# Patient Record
Sex: FEMALE | Race: WHITE | Employment: FULL TIME | URBAN - METROPOLITAN AREA
[De-identification: names, ages, dates, MRNs, and addresses within clinical notes are randomized per-mention and may not be internally consistent; named-entity substitution may affect disease eponyms.]

---

## 2016-05-12 LAB — PAP SMEAR - HIM PATIENT REPORTED: NEGATIVE

## 2017-01-25 DIAGNOSIS — F41.1 GAD (GENERALIZED ANXIETY DISORDER): Primary | ICD-10-CM

## 2017-01-25 NOTE — TELEPHONE ENCOUNTER
ALPRAZolam (XANAX) 0.5 MG tablet      Last Written Prescription Date:  12/6/16  Last Fill Quantity: 30,   # refills: 0  Last Office Visit with [facility], [facility] or [facility] prescribing provider: 12/6/16  Future Office visit:       Routing refill request to provider for review/approval because:  Drug not on the [facility], [facility] or [facility] refill protocol or controlled substance

## 2017-01-26 RX ORDER — ALPRAZOLAM 0.5 MG
TABLET ORAL
Qty: 30 TABLET | Refills: 1 | Status: SHIPPED | OUTPATIENT
Start: 2017-01-26 | End: 2017-03-30

## 2017-03-30 ENCOUNTER — TELEPHONE (OUTPATIENT)
Dept: FAMILY MEDICINE | Facility: CLINIC | Age: 47
End: 2017-03-30

## 2017-03-30 DIAGNOSIS — F41.1 GAD (GENERALIZED ANXIETY DISORDER): ICD-10-CM

## 2017-03-30 RX ORDER — ALPRAZOLAM 0.5 MG
TABLET ORAL
Qty: 30 TABLET | Refills: 0 | Status: SHIPPED | OUTPATIENT
Start: 2017-03-30 | End: 2017-05-05

## 2017-03-30 NOTE — TELEPHONE ENCOUNTER
Xanax       Last Written Prescription Date: 01/26/17  Last Fill Quantity: 30,  # refills: 1   Last Office Visit with [facility], [facility] or [facility] prescribing provider: 12/06/16                                             Routing refill request to provider for review/approval because:  Drug not on the [facility] refill protocol   Pilar Echavarria RN

## 2017-04-18 ENCOUNTER — TRANSFERRED RECORDS (OUTPATIENT)
Dept: HEALTH INFORMATION MANAGEMENT | Facility: CLINIC | Age: 47
End: 2017-04-18

## 2017-05-03 ENCOUNTER — TELEPHONE (OUTPATIENT)
Dept: FAMILY MEDICINE | Facility: CLINIC | Age: 47
End: 2017-05-03

## 2017-05-03 NOTE — TELEPHONE ENCOUNTER
Panel Management Review      BP Readings from Last 1 Encounters:   12/06/16 118/86    , No results found for: A1C, 3/30/2017    Fail List measure: cervical      Patient is due/failing the following:   PAP    Action needed:   Pt has appt at [facility] next week to have PAP done.     Type of outreach:    Phone, spoke to patient.  Will route chart to abstracting     Questions for provider review:    None                                                                                                                                    Yuliana Rankin, [facility]

## 2017-05-05 DIAGNOSIS — F41.1 GAD (GENERALIZED ANXIETY DISORDER): ICD-10-CM

## 2017-05-05 RX ORDER — ALPRAZOLAM 0.5 MG
TABLET ORAL
Qty: 30 TABLET | Refills: 0 | Status: SHIPPED | OUTPATIENT
Start: 2017-05-05 | End: 2017-06-27

## 2017-05-05 NOTE — TELEPHONE ENCOUNTER
ALPRAZolam (XANAX) 0.5 MG tablet      Last Written Prescription Date:  3/30/17  Last Fill Quantity: 30,   # refills: 0  Last Office Visit with [facility], [facility] or [facility] prescribing provider: 12/6/16  Future Office visit:       Routing refill request to provider for review/approval because:  Drug not on the [facility], [facility] or [facility] refill protocol or controlled substance

## 2017-05-12 ENCOUNTER — TRANSFERRED RECORDS (OUTPATIENT)
Dept: HEALTH INFORMATION MANAGEMENT | Facility: CLINIC | Age: 47
End: 2017-05-12

## 2017-06-20 ENCOUNTER — TRANSFERRED RECORDS (OUTPATIENT)
Dept: HEALTH INFORMATION MANAGEMENT | Facility: CLINIC | Age: 47
End: 2017-06-20

## 2017-06-20 LAB
HPV ABSTRACT: NORMAL
PAP-ABSTRACT: NORMAL

## 2017-06-27 ENCOUNTER — TELEPHONE (OUTPATIENT)
Dept: FAMILY MEDICINE | Facility: CLINIC | Age: 47
End: 2017-06-27

## 2017-06-27 DIAGNOSIS — F41.1 GAD (GENERALIZED ANXIETY DISORDER): ICD-10-CM

## 2017-06-27 RX ORDER — ALPRAZOLAM 0.5 MG
TABLET ORAL
Qty: 15 TABLET | Refills: 0 | Status: SHIPPED | OUTPATIENT
Start: 2017-06-27 | End: 2017-08-22

## 2017-06-27 NOTE — TELEPHONE ENCOUNTER
ALPRAZolam (XANAX) 0.5 MG tablet      Last Written Prescription Date:  05/05/17  Last Fill Quantity: 30,   # refills: 0  Last Office Visit with [facility], [facility] or [facility] prescribing provider: 12/06/16 Dr. Gonzalez  Future Office visit:       Routing refill request to provider for review/approval because:  Drug not on the [facility], [facility] or [facility] refill protocol or controlled substance

## 2017-06-29 NOTE — TELEPHONE ENCOUNTER
Reason for Call:  Other prescription    Detailed comments: please refax Xanix asa  pharmacy has not received it thanks, can you call her when done     Phone Number Patient can be reached at: [phone]    Best Time: any    Can we leave a detailed message on this number? YES    Call taken on 6/29/2017 at 4:39 PM by Rae Marks

## 2017-08-22 ENCOUNTER — OFFICE VISIT (OUTPATIENT)
Dept: FAMILY MEDICINE | Facility: CLINIC | Age: 47
End: 2017-08-22
Payer: COMMERCIAL

## 2017-08-22 VITALS
WEIGHT: 162.5 LBS | BODY MASS INDEX: 24.63 KG/M2 | OXYGEN SATURATION: 98 % | TEMPERATURE: 98.2 F | HEART RATE: 60 BPM | SYSTOLIC BLOOD PRESSURE: 126 MMHG | DIASTOLIC BLOOD PRESSURE: 86 MMHG | HEIGHT: 68 IN

## 2017-08-22 DIAGNOSIS — N84.0 ENDOMETRIAL POLYP: ICD-10-CM

## 2017-08-22 DIAGNOSIS — F41.1 GAD (GENERALIZED ANXIETY DISORDER): Primary | ICD-10-CM

## 2017-08-22 DIAGNOSIS — J06.9 VIRAL UPPER RESPIRATORY TRACT INFECTION: ICD-10-CM

## 2017-08-22 DIAGNOSIS — R09.81 CONGESTION OF PARANASAL SINUS: ICD-10-CM

## 2017-08-22 PROCEDURE — 99214 OFFICE O/P EST MOD 30 MIN: CPT | Performed by: FAMILY MEDICINE

## 2017-08-22 RX ORDER — ALPRAZOLAM 0.5 MG
TABLET ORAL
Qty: 30 TABLET | Refills: 0 | Status: SHIPPED | OUTPATIENT
Start: 2017-08-22 | End: 2017-11-22

## 2017-08-22 ASSESSMENT — ANXIETY QUESTIONNAIRES
GAD7 TOTAL SCORE: 3
6. BECOMING EASILY ANNOYED OR IRRITABLE: NOT AT ALL
7. FEELING AFRAID AS IF SOMETHING AWFUL MIGHT HAPPEN: NOT AT ALL
1. FEELING NERVOUS, ANXIOUS, OR ON EDGE: SEVERAL DAYS
3. WORRYING TOO MUCH ABOUT DIFFERENT THINGS: NOT AT ALL
2. NOT BEING ABLE TO STOP OR CONTROL WORRYING: NOT AT ALL
IF YOU CHECKED OFF ANY PROBLEMS ON THIS QUESTIONNAIRE, HOW DIFFICULT HAVE THESE PROBLEMS MADE IT FOR YOU TO DO YOUR WORK, TAKE CARE OF THINGS AT HOME, OR GET ALONG WITH OTHER PEOPLE: NOT DIFFICULT AT ALL
5. BEING SO RESTLESS THAT IT IS HARD TO SIT STILL: SEVERAL DAYS

## 2017-08-22 ASSESSMENT — PATIENT HEALTH QUESTIONNAIRE - PHQ9
5. POOR APPETITE OR OVEREATING: SEVERAL DAYS
SUM OF ALL RESPONSES TO PHQ QUESTIONS 1-9: 0

## 2017-08-22 ASSESSMENT — PAIN SCALES - GENERAL: PAINLEVEL: NO PAIN (0)

## 2017-08-22 NOTE — PROGRESS NOTES
SUBJECTIVE:   Celina Ledesma is a 46 year old female who presents to clinic today for the following health issues:      Medication Followup of Alprazolam    Taking Medication as prescribed: yes    Side Effects:  None    Medication Helping Symptoms:  Yes    Symptoms improved overall with less need for the alprazolam - when taken it worse well.  Random symptoms.       Taking trazodone daily 1/2-1 pill.  Has supply at home.  Nausea if awakening in middle of night.      Depression and Anxiety Follow-Up    Status since last visit: Improved     Other associated symptoms:None    Complicating factors:     Significant life event: No     Current substance abuse: None  Counseling in progress.  PHQ-9 SCORE 11/8/2016 12/6/2016 8/22/2017   Total Score 0 2 0     YUDI-7 SCORE 12/6/2016 8/22/2017   Total Score 9 3       PHQ-9  English  PHQ-9   Any Language  GAD7    Endometrial polyp - U/S 6/27/17.  Not heavy flow.  No cramping.  U/S through [facility] with polyp noted.  No treatment recommended.    URI - congestion, post nasal drainage.  Symptoms present for last week.  No fever.  Occasional cough.  No sore throat.        Problem list and histories reviewed & adjusted, as indicated.  Additional history: as documented    BP Readings from Last 3 Encounters:   08/22/17 126/86   12/06/16 118/86   11/08/16 110/82    Wt Readings from Last 3 Encounters:   08/22/17 73.7 kg (162 lb 8 oz)   12/06/16 73.4 kg (161 lb 12.8 oz)   11/08/16 73.6 kg (162 lb 3.2 oz)                      Reviewed and updated as needed this visit by clinical staffTobacco  Allergies  Meds  Med Hx  Surg Hx  Fam Hx  Soc Hx      Reviewed and updated as needed this visit by Provider  Tobacco  Allergies  Meds  Med Hx  Surg Hx  Fam Hx  Soc Hx        ROS:  Constitutional, HEENT, cardiovascular, pulmonary, gi and gu systems are negative, except as otherwise noted.      OBJECTIVE:   /86 (BP Location: Right arm, Patient Position: Chair, Cuff Size:  "Adult Regular)  Pulse 60  Temp 98.2  F (36.8  C) (Oral)  Ht 1.724 m (5' 7.87\")  Wt 73.7 kg (162 lb 8 oz)  LMP 07/03/2017 (Exact Date)  SpO2 98%  Breastfeeding? No  BMI 24.8 kg/m2  Body mass index is 24.8 kg/(m^2).  GENERAL: alert and no distress  HENT: normal cephalic/atraumatic, both ears: clear effusion, nose and mouth without ulcers or lesions, nasal mucosa edematous , rhinorrhea clear, white and postnasal drainage, oropharynx clear and oral mucous membranes moist  NECK: no adenopathy, no asymmetry, masses, or scars and thyroid normal to palpation  RESP: lungs clear to auscultation - no rales, rhonchi or wheezes  CV: regular rate and rhythm, normal S1 S2, no S3 or S4, no murmur, click or rub, no peripheral edema and peripheral pulses strong  MS: no gross musculoskeletal defects noted, no edema  PSYCH: mentation appears normal, affect normal/bright, anxious, judgement and insight intact and appearance well groomed        ASSESSMENT/PLAN:         1. YUDI (generalized anxiety disorder)  Continue Effexor, Buspar 5 mg twice a day (larger doses not tolerated), trazodone at night.  Prn xanax - using much less than previous.    - ALPRAZolam (XANAX) 0.5 MG tablet; TAKE 1/2-1 TABLET BY MOUTH 3 TIMES DAILY AS NEEDED FOR ANXIETY  Dispense: 30 tablet; Refill: 0    2. Viral upper respiratory tract infection  3. Congestion of paranasal sinus  Symptomatic care as listed.  Send follow up message if symptoms persist through next 3-4 days and amoxicillin 875 mg twice a day will be given for sinus/ear.      4. Endometrial polyp  On U/S monitor bleeding.  Hysteroscopy/D&C if heavy or prolonged bleeding occurs.        Patient Instructions   Continue the current Buspar, Effexor and Trazodone.  Use the alprazolam as needed.      For the congestion symptoms, continue the sudafed you have at home.  If you have continued symptoms through the next 2-3 days send message with update and additional treatment can be given.  "           Lucero Gonzalez MD  [facility]

## 2017-08-22 NOTE — PATIENT INSTRUCTIONS
Continue the current Buspar, Effexor and Trazodone.  Use the alprazolam as needed.      For the congestion symptoms, continue the sudafed you have at home.  If you have continued symptoms through the next 2-3 days send message with update and additional treatment can be given.

## 2017-08-22 NOTE — Clinical Note
Please abstract the following data from this visit with this patient into the appropriate field in Epic:  Pap smear done on this date: 6/20/17 (approximately), by this group: [facility] - in Care Everywhere, results were PAP -normal HPV negative.

## 2017-08-22 NOTE — MR AVS SNAPSHOT
"              After Visit Summary   8/22/2017    Celina Ledesma    MRN: [id number]           Patient Information     Date Of Birth          1970        Visit Information        Provider Department      8/22/2017 9:20 AM Lucero Gonzalez MD [facility]        Today's Diagnoses     YUDI (generalized anxiety disorder)          Care Instructions    Continue the current Buspar, Effexor and Trazodone.  Use the alprazolam as needed.      For the congestion symptoms, continue the sudafed you have at home.  If you have continued symptoms through the next 2-3 days send message with update and additional treatment can be given.                Follow-ups after your visit        Who to contact     If you have questions or need follow up information about today's clinic visit or your schedule please contact [facility] directly at [phone].  Normal or non-critical lab and imaging results will be communicated to you by Confabbhart, letter or phone within 4 business days after the clinic has received the results. If you do not hear from us within 7 days, please contact the clinic through MyChart or phone. If you have a critical or abnormal lab result, we will notify you by phone as soon as possible.  Submit refill requests through [employer] or call your pharmacy and they will forward the refill request to us. Please allow 3 business days for your refill to be completed.          Additional Information About Your Visit        MyChart Information     [employer] lets you send messages to your doctor, view your test results, renew your prescriptions, schedule appointments and more. To sign up, go to [email or web address] . Click on \"Log in\" on the left side of the screen, which will take you to the Welcome page. Then click on \"Sign up Now\" on the right side of the page.     You will be asked to enter the access code listed below, as well as some personal information. Please follow the directions " "to create your username and password.     Your access code is: [email or web address]  Expires: 2017 10:05 AM     Your access code will  in 90 days. If you need help or a new code, please call your [facility] clinic or [phone].        Care EveryWhere ID     This is your Care EveryWhere ID. This could be used by other organizations to access your [facility] medical records  [id number]        Your Vitals Were     Pulse Temperature Height Last Period Pulse Oximetry Breastfeeding?    60 98.2  F (36.8  C) (Oral) 1.724 m (5' 7.87\") 2017 (Exact Date) 98% No    BMI (Body Mass Index)                   24.8 kg/m2            Blood Pressure from Last 3 Encounters:   17 126/86   16 118/86   16 110/82    Weight from Last 3 Encounters:   17 73.7 kg (162 lb 8 oz)   16 73.4 kg (161 lb 12.8 oz)   16 73.6 kg (162 lb 3.2 oz)              Today, you had the following     No orders found for display         Today's Medication Changes          These changes are accurate as of: 17 10:07 AM.  If you have any questions, ask your nurse or doctor.               These medicines have changed or have updated prescriptions.        Dose/Directions    ALPRAZolam 0.5 MG tablet   Commonly known as:  XANAX   This may have changed:  additional instructions   Used for:  YUDI (generalized anxiety disorder)   Changed by:  Lucero Gonzaelz MD        TAKE 1/2-1 TABLET BY MOUTH 3 TIMES DAILY AS NEEDED FOR ANXIETY   Quantity:  30 tablet   Refills:  0            Where to get your medicines      Some of these will need a paper prescription and others can be bought over the counter.  Ask your nurse if you have questions.     Bring a paper prescription for each of these medications     ALPRAZolam 0.5 MG tablet                Primary Care Provider    None Specified       No primary provider on file.        Equal Access to Services     SARKIS HOFFMAN AH: Miranda Pacheco, anastasia jesus, junie banerjee " shaggy rincondedra tanichano martaan ah. So [facility] [phone].    ATENCIÓN: Si norbertola venice, tiene a quiñonez disposición servicios gratuitos de asistencia lingüística. Sheryl al [phone].    We comply with applicable federal civil rights laws and [place] laws. We do not discriminate on the basis of race, color, national origin, age, disability sex, sexual orientation or gender identity.            Thank you!     Thank you for choosing [facility]  for your care. Our goal is always to provide you with excellent care. Hearing back from our patients is one way we can continue to improve our services. Please take a few minutes to complete the written survey that you may receive in the mail after your visit with us. Thank you!             Your Updated Medication List - Protect others around you: Learn how to safely use, store and throw away your medicines at www.disposemymeds.org.          This list is accurate as of: 8/22/17 10:07 AM.  Always use your most recent med list.                   Brand Name Dispense Instructions for use Diagnosis    ALPRAZolam 0.5 MG tablet    XANAX    30 tablet    TAKE 1/2-1 TABLET BY MOUTH 3 TIMES DAILY AS NEEDED FOR ANXIETY    YUDI (generalized anxiety disorder)       busPIRone 5 MG tablet    BUSPAR    60 tablet    Take 1 tablet (5 mg) by mouth 2 times daily    YUDI (generalized anxiety disorder)       clindamycin 1 % topical gel    CLINDAMAX     APPLY TOPICALLY 2 TIMES DAILY.        traZODone 50 MG tablet    DESYREL     TAKE 1 TAB BY MOUTH DAILY AT BEDTIME        venlafaxine 75 MG 24 hr capsule    EFFEXOR-XR    30 capsule    Take 1 capsule (75 mg) by mouth daily    UYDI (generalized anxiety disorder)

## 2017-08-22 NOTE — NURSING NOTE
"Chief Complaint   Patient presents with     Recheck Medication     Alprazolam       Initial /86 (BP Location: Right arm, Patient Position: Chair, Cuff Size: Adult Regular)  Pulse 60  Temp 98.2  F (36.8  C) (Oral)  Ht 1.724 m (5' 7.87\")  Wt 73.7 kg (162 lb 8 oz)  LMP 07/03/2017 (Exact Date)  SpO2 98%  Breastfeeding? No  BMI 24.8 kg/m2 Estimated body mass index is 24.8 kg/(m^2) as calculated from the following:    Height as of this encounter: 1.724 m (5' 7.87\").    Weight as of this encounter: 73.7 kg (162 lb 8 oz).  Medication Reconciliation: complete     AMBER Tran MA      "

## 2017-08-23 ASSESSMENT — ANXIETY QUESTIONNAIRES: GAD7 TOTAL SCORE: 3

## 2017-08-24 ENCOUNTER — MYC MEDICAL ADVICE (OUTPATIENT)
Dept: FAMILY MEDICINE | Facility: CLINIC | Age: 47
End: 2017-08-24

## 2017-08-24 DIAGNOSIS — J01.00 ACUTE MAXILLARY SINUSITIS, RECURRENCE NOT SPECIFIED: ICD-10-CM

## 2017-08-24 DIAGNOSIS — H65.03 BILATERAL ACUTE SEROUS OTITIS MEDIA, RECURRENCE NOT SPECIFIED: Primary | ICD-10-CM

## 2017-08-24 RX ORDER — AMOXICILLIN 875 MG
875 TABLET ORAL 2 TIMES DAILY
Qty: 20 TABLET | Refills: 0 | Status: SHIPPED | OUTPATIENT
Start: 2017-08-24 | End: 2018-02-12

## 2017-08-24 NOTE — TELEPHONE ENCOUNTER
See office visit note for further information.    Allyson Cole RN, [facility] Triage

## 2017-11-22 DIAGNOSIS — F41.1 GAD (GENERALIZED ANXIETY DISORDER): ICD-10-CM

## 2017-11-24 RX ORDER — ALPRAZOLAM 0.5 MG
TABLET ORAL
Qty: 30 TABLET | Refills: 0 | Status: SHIPPED | OUTPATIENT
Start: 2017-11-24 | End: 2018-02-12

## 2017-12-04 ENCOUNTER — TRANSFERRED RECORDS (OUTPATIENT)
Dept: HEALTH INFORMATION MANAGEMENT | Facility: CLINIC | Age: 47
End: 2017-12-04

## 2018-01-24 DIAGNOSIS — F41.1 GAD (GENERALIZED ANXIETY DISORDER): ICD-10-CM

## 2018-01-24 DIAGNOSIS — F51.01 PRIMARY INSOMNIA: Primary | ICD-10-CM

## 2018-01-24 RX ORDER — BUSPIRONE HYDROCHLORIDE 5 MG/1
5 TABLET ORAL 2 TIMES DAILY
Qty: 60 TABLET | Refills: 0 | Status: SHIPPED | OUTPATIENT
Start: 2018-01-24 | End: 2018-02-12

## 2018-01-24 RX ORDER — VENLAFAXINE HYDROCHLORIDE 75 MG/1
75 CAPSULE, EXTENDED RELEASE ORAL DAILY
Qty: 30 CAPSULE | Refills: 0 | Status: SHIPPED | OUTPATIENT
Start: 2018-01-24 | End: 2018-02-12

## 2018-01-24 RX ORDER — TRAZODONE HYDROCHLORIDE 50 MG/1
TABLET, FILM COATED ORAL
Qty: 30 TABLET | Refills: 0 | Status: SHIPPED | OUTPATIENT
Start: 2018-01-24 | End: 2018-02-12

## 2018-01-24 NOTE — TELEPHONE ENCOUNTER
.Reason for Call:  Medication or medication refill:    Do you use a [facility]?  Name of the pharmacy and phone number for the current request:  [id number] - [place], [place] - [address] [place] [address] [address] [Patient Preferred]  [phone]    Name of the medication requested: 1. traZODone (DESYREL) 50 MG tablet  2. busPIRone (BUSPAR) 5 MG tablet  3. venlafaxine (EFFEXOR-XR) 75 MG 24 hr capsule    Other request:     Can we leave a detailed message on this number? YES    Phone number patient can be reached at: Cell number on file:    Telephone Information:   Mobile [phone]       Best Time: any    Call taken on 1/24/2018 at 10:44 AM by Nicole Thacker

## 2018-01-24 NOTE — TELEPHONE ENCOUNTER
Requested Prescriptions   Pending Prescriptions Disp Refills     busPIRone (BUSPAR) 5 MG tablet 60 tablet 5     Sig: Take 1 tablet (5 mg) by mouth 2 times daily    There is no refill protocol information for this order        venlafaxine (EFFEXOR-XR) 75 MG 24 hr capsule 30 capsule 1     Sig: Take 1 capsule (75 mg) by mouth daily    There is no refill protocol information for this order        traZODone (DESYREL) 50 MG tablet 90 tablet 1     Sig: TAKE 1 TAB BY MOUTH DAILY AT BEDTIME    There is no refill protocol information for this order        PHQ-9 SCORE 11/8/2016 12/6/2016 8/22/2017   Total Score 0 2 0     Last OV: 8/22/2017    Routing refill request to provider for review/approval because:  Medication is reported/historical-Melezadone    April Mckeon RN, BSN

## 2018-02-12 ENCOUNTER — OFFICE VISIT (OUTPATIENT)
Dept: FAMILY MEDICINE | Facility: CLINIC | Age: 48
End: 2018-02-12
Payer: COMMERCIAL

## 2018-02-12 VITALS
OXYGEN SATURATION: 99 % | HEART RATE: 73 BPM | RESPIRATION RATE: 14 BRPM | DIASTOLIC BLOOD PRESSURE: 80 MMHG | TEMPERATURE: 98 F | SYSTOLIC BLOOD PRESSURE: 124 MMHG | WEIGHT: 174.8 LBS | BODY MASS INDEX: 26.68 KG/M2

## 2018-02-12 DIAGNOSIS — F41.1 GAD (GENERALIZED ANXIETY DISORDER): Primary | ICD-10-CM

## 2018-02-12 DIAGNOSIS — R93.89 ABNORMAL CHEST X-RAY: ICD-10-CM

## 2018-02-12 DIAGNOSIS — F51.01 PRIMARY INSOMNIA: ICD-10-CM

## 2018-02-12 DIAGNOSIS — M21.619 BUNION: ICD-10-CM

## 2018-02-12 DIAGNOSIS — D17.30 LIPOMA OF SKIN AND SUBCUTANEOUS TISSUE: ICD-10-CM

## 2018-02-12 PROCEDURE — 99214 OFFICE O/P EST MOD 30 MIN: CPT | Performed by: FAMILY MEDICINE

## 2018-02-12 RX ORDER — BUSPIRONE HYDROCHLORIDE 5 MG/1
5 TABLET ORAL 2 TIMES DAILY
Qty: 180 TABLET | Refills: 1 | Status: SHIPPED | OUTPATIENT
Start: 2018-02-12 | End: 2018-08-15

## 2018-02-12 RX ORDER — VENLAFAXINE HYDROCHLORIDE 75 MG/1
75 CAPSULE, EXTENDED RELEASE ORAL DAILY
Qty: 90 CAPSULE | Refills: 1 | Status: SHIPPED | OUTPATIENT
Start: 2018-02-12 | End: 2018-08-08

## 2018-02-12 RX ORDER — ALPRAZOLAM 0.5 MG
TABLET ORAL
Qty: 30 TABLET | Refills: 0 | Status: SHIPPED | OUTPATIENT
Start: 2018-02-12 | End: 2018-04-05

## 2018-02-12 RX ORDER — TRAZODONE HYDROCHLORIDE 50 MG/1
TABLET, FILM COATED ORAL
Qty: 90 TABLET | Refills: 1 | Status: SHIPPED | OUTPATIENT
Start: 2018-02-12 | End: 2018-08-15

## 2018-02-12 ASSESSMENT — PAIN SCALES - GENERAL: PAINLEVEL: NO PAIN (0)

## 2018-02-12 ASSESSMENT — ANXIETY QUESTIONNAIRES
GAD7 TOTAL SCORE: 5
6. BECOMING EASILY ANNOYED OR IRRITABLE: NOT AT ALL
1. FEELING NERVOUS, ANXIOUS, OR ON EDGE: SEVERAL DAYS
3. WORRYING TOO MUCH ABOUT DIFFERENT THINGS: SEVERAL DAYS
5. BEING SO RESTLESS THAT IT IS HARD TO SIT STILL: SEVERAL DAYS
2. NOT BEING ABLE TO STOP OR CONTROL WORRYING: NOT AT ALL
7. FEELING AFRAID AS IF SOMETHING AWFUL MIGHT HAPPEN: SEVERAL DAYS

## 2018-02-12 ASSESSMENT — PATIENT HEALTH QUESTIONNAIRE - PHQ9: 5. POOR APPETITE OR OVEREATING: SEVERAL DAYS

## 2018-02-12 NOTE — PROGRESS NOTES
SUBJECTIVE:   Celina Ledesma is a 47 year old female who presents to clinic today for the following health issues:    Anxiety Follow-Up    Status since last visit: No change    Other associated symptoms:None    Complicating factors: none  Significant life event: No   Current substance abuse: None  Depression symptoms: No    YUDI-7 SCORE 12/6/2016 8/22/2017 2/12/2018   Total Score 9 3 5     PHQ-9 SCORE 12/6/2016 8/22/2017 2/12/2018   Total Score 2 0 3       YUDI-7    Amount of exercise or physical activity: 6-7 days/week for an average of 45-60 minutes    Problems taking medications regularly: No    Medication side effects: none    Diet: regular (no restrictions)    Problem list and histories reviewed & adjusted, as indicated.  Additional history: as documented    BP Readings from Last 3 Encounters:   02/12/18 124/80   08/22/17 126/86   12/06/16 118/86    Wt Readings from Last 3 Encounters:   02/12/18 79.3 kg (174 lb 12.8 oz)   08/22/17 73.7 kg (162 lb 8 oz)   12/06/16 73.4 kg (161 lb 12.8 oz)         Patient states that her anxiety and depression has been manageable and has no change since last visit. She has been taking Vitamin D medication and using a sun lamp throughout the winter. She reports sleeping well and having no troubles when taking trazodone.  She continues to have high anxiety over health concerns.    Abnormal chest xray -   Patient was seen at [facility] with cough/URI symptoms.  Xray done with notation of a questionable nodular opacity of the right rib/lung area.  Was to follow up in 1 month per their recommendation but did not follow up.  No cough or other symptoms currently.  Declines immediate follow up with will consider cxr in April when she return to clinic for annual exam.      [facility]2017  [facility]  Result Narrative   COMPARISON:  None.    FINDINGS:  Two views were obtained. No evidence for pneumonia. Questionable nodular opacity projecting over the posterior lateral  portion of the fourth rib on the right. Density over the rib suggests that this is either calcified or located within the rib itself, but a noncalcified pulmonary nodule cannot be excluded. Consider chest CT without contrast for further evaluation if clinically indicated.       Right Forearm  Patient had questions regarding a lump on the anterior side of her right forearm. She claims the lump is never painful.    Reviewed and updated as needed this visit by clinical staff  Tobacco  Meds  Med Hx  Surg Hx  Fam Hx  Soc Hx      Reviewed and updated as needed this visit by Provider  Tobacco  Allergies  Meds  Med Hx  Surg Hx  Fam Hx  Soc Hx      ROS:  Constitutional, HEENT, cardiovascular, pulmonary, GI, , musculoskeletal, neuro, skin, endocrine and psych systems are negative, except as otherwise noted.    This document serves as a record of the services and decisions personally performed and made by Lucero Gonzalez MD. It was created on her behalf by ISIDRO CHANEY, a trained medical scribe. The creation of this document is based on the provider's statements to the medical scribe.  ISIDRO CHANEY 3:31 PM February 12, 2018    OBJECTIVE:     /80 (BP Location: Right arm, Patient Position: Sitting, Cuff Size: Adult Large)  Pulse 73  Temp 98  F (36.7  C) (Oral)  Resp 14  Wt 79.3 kg (174 lb 12.8 oz)  LMP 02/02/2018  SpO2 99%  Breastfeeding? No  BMI 26.68 kg/m2  Body mass index is 26.68 kg/(m^2).  GENERAL: alert, no distress and over weight  RESP: lungs clear to auscultation - no rales, rhonchi or wheezes  CV: regular rate and rhythm, normal S1 S2, no S3 or S4, no murmur, click or rub, no peripheral edema and peripheral pulses strong  SKIN: right forearm 3 mm mobile lipomatous like lump, no overlying skin change  PSYCH:  Mentation intact, no overt depression noted, mild anxiety, well groomed.        Diagnostic Test Results:  none     ASSESSMENT/PLAN:     1. YUDI (generalized anxiety disorder)  Refills  today.  Continue current medication with prn xanax.    - traZODone (DESYREL) 50 MG tablet; TAKE 1 TAB BY MOUTH DAILY AT BEDTIME  Dispense: 90 tablet; Refill: 1  - venlafaxine (EFFEXOR-XR) 75 MG 24 hr capsule; Take 1 capsule (75 mg) by mouth daily  Dispense: 90 capsule; Refill: 1  - busPIRone (BUSPAR) 5 MG tablet; Take 1 tablet (5 mg) by mouth 2 times daily  Dispense: 180 tablet; Refill: 1  - ALPRAZolam (XANAX) 0.5 MG tablet; TAKE 1/2-1 TABLET BY MOUTH 3 TIMES A DAY AS NEEDED FOR ANXIETY  Dispense: 30 tablet; Refill: 0    2. Primary insomnia  Continue   - traZODone (DESYREL) 50 MG tablet; TAKE 1 TAB BY MOUTH DAILY AT BEDTIME  Dispense: 90 tablet; Refill: 1    3. Bunion  Consider evaluation if pain or skin breakdown occurs.     4. Lipoma of skin and subcutaneous tissue  Monitor with annual    5.  Abnormal chest xray  Repeat xray and/or CT with annual exam appointment.        Patient Instructions     Refill today.    Plan on doing two appointments a year for chronic problems. One for your physical exam and the other for a med recheck appointment.    Follow up in April for physical exam and X-ray.    Wear wide, supportive shoes. Avoid pointed shoes.          The information in this document, created by the medical scribe for me, accurately reflects the services I personally performed and the decisions made by me. I have reviewed and approved this document for accuracy prior to leaving the patient care area.  February 12, 2018 3:52 PM    Lucero Gonzalez MD  [facility]

## 2018-02-12 NOTE — MR AVS SNAPSHOT
After Visit Summary   2/12/2018    Celina Ledesma    MRN: [id number]           Patient Information     Date Of Birth          1970        Visit Information        Provider Department      2/12/2018 3:20 PM Lucero Gonzalez MD [facility]        Today's Diagnoses     YUDI (generalized anxiety disorder)        Primary insomnia          Care Instructions    Refill today.    Plan on doing two appointments a year for chronic problems. One for your physical exam and the other for a med recheck appointment.    Follow up in April for physical exam and X-ray.    Wear wide, supportive shoes. Avoid pointed shoes.    At [facility], we strive to deliver an exceptional experience to you, every time we see you.  If you receive a survey in the mail, please send us back your thoughts. We really do value your feedback.    Based on your medical history, these are the current health maintenance/preventive care services that you are due for (some may have been done at this visit.)  Health Maintenance Due   Topic Date Due     LIPID SCREEN Q5 YR FEMALE (SYSTEM ASSIGNED)  09/15/2015     INFLUENZA VACCINE (SYSTEM ASSIGNED)  09/01/2017         Suggested websites for health information:  WwwDrifty : Up to date and easily searchable information on multiple topics.  Www.medlineplus.gov : medication info, interactive tutorials, watch real surgeries online  Www.familydoctor.org : good info from the Academy of Family Physicians  Www.cdc.gov : public health info, travel advisories, epidemics (H1N1)  Www.aap.org : children's health info, normal development, vaccinations  Www.health.[facility].[place].us : [place] dept of health, public health issues in [place], N1N1    Your care team:     Family Medicine   AUGUSTA Weaver MD Emily Bunt, APRN JOSE ANTONIO   S. MD Lucero Do MD Angela Wermerskirchen, MD         Clinic hours: Monday - Wednesday 7 am-7 pm    Thursdays and Fridays 7 am-5 pm.     [facility]: Monday - Friday 11 am-9 pm,   Saturday and Sunday 9 am-5 pm.    [facility]: Monday -Thursday 8 am-8 pm; Friday 8 am-6 pm; Saturday and Sunday 9 am-5 pm.     [facility]: Monday - Thursday 8 am - 7 pm; Friday 8 am - 6 pm    Clinic: [phone]   [facility]: [phone]   [facility]: [phone]                  Follow-ups after your visit        Who to contact     If you have questions or need follow up information about today's clinic visit or your schedule please contact [facility] directly at [phone].  Normal or non-critical lab and imaging results will be communicated to you by MyChart, letter or phone within 4 business days after the clinic has received the results. If you do not hear from us within 7 days, please contact the clinic through Punchhhart or phone. If you have a critical or abnormal lab result, we will notify you by phone as soon as possible.  Submit refill requests through [employer] or call your pharmacy and they will forward the refill request to us. Please allow 3 business days for your refill to be completed.          Additional Information About Your Visit        MyChart Information     [employer] gives you secure access to your electronic health record. If you see a primary care provider, you can also send messages to your care team and make appointments. If you have questions, please call your primary care clinic.  If you do not have a primary care provider, please call [phone] and they will assist you.        Care EveryWhere ID     This is your Care EveryWhere ID. This could be used by other organizations to access your [place] medical records  [id number]        Your Vitals Were     Pulse Temperature Respirations Last Period Pulse Oximetry Breastfeeding?    73 98  F (36.7  C) (Oral) 14 02/02/2018 99% No    BMI (Body Mass Index)                    26.68 kg/m2            Blood Pressure from Last 3 Encounters:   02/12/18 124/80   08/22/17 126/86   12/06/16 118/86    Weight from Last 3 Encounters:   02/12/18 79.3 kg (174 lb 12.8 oz)   08/22/17 73.7 kg (162 lb 8 oz)   12/06/16 73.4 kg (161 lb 12.8 oz)              Today, you had the following     No orders found for display         Today's Medication Changes          These changes are accurate as of 2/12/18  3:47 PM.  If you have any questions, ask your nurse or doctor.               These medicines have changed or have updated prescriptions.        Dose/Directions    ALPRAZolam 0.5 MG tablet   Commonly known as:  XANAX   This may have changed:  See the new instructions.   Used for:  YUDI (generalized anxiety disorder)   Changed by:  Lucero Gonzalez MD        TAKE 1/2-1 TABLET BY MOUTH 3 TIMES A DAY AS NEEDED FOR ANXIETY   Quantity:  30 tablet   Refills:  0       traZODone 50 MG tablet   Commonly known as:  DESYREL   This may have changed:  additional instructions   Used for:  YUDI (generalized anxiety disorder), Primary insomnia   Changed by:  Lucero Gonzalez MD        TAKE 1 TAB BY MOUTH DAILY AT BEDTIME   Quantity:  90 tablet   Refills:  1       venlafaxine 75 MG 24 hr capsule   Commonly known as:  EFFEXOR-XR   This may have changed:  additional instructions   Used for:  YUDI (generalized anxiety disorder)   Changed by:  Lucero Gonzalez MD        Dose:  75 mg   Take 1 capsule (75 mg) by mouth daily   Quantity:  90 capsule   Refills:  1         Stop taking these medicines if you haven't already. Please contact your care team if you have questions.     amoxicillin 875 MG tablet   Commonly known as:  AMOXIL   Stopped by:  Lucero Gonzalez MD                Where to get your medicines      These medications were sent to [facility]/pharmacy #[id number] - [place], [place] - [address]  [address], [place] [address]     Phone:  [phone]     busPIRone 5 MG tablet    traZODone 50 MG tablet     venlafaxine 75 MG 24 hr capsule         Some of these will need a paper prescription and others can be bought over the counter.  Ask your nurse if you have questions.     Bring a paper prescription for each of these medications     ALPRAZolam 0.5 MG tablet                Primary Care Provider Office Phone # Fax #    Lucero Gonzalez [phone] [address]  [facility] [address]        Equal Access to Services     SARKIS HOFFMAN : Hadii aad ku hadasho Soomaali, waaxda luqadaha, qaybta kaalmada adeegyada, waxay idiin hayaan adeeg kharash lagal . So [facility] [phone].    ATENCIÓN: Si spencer millard, tiene a quiñonez disposición servicios gratuitos de asistencia lingüística. Sheryl al [phone].    We comply with applicable federal civil rights laws and [place] laws. We do not discriminate on the basis of race, color, national origin, age, disability, sex, sexual orientation, or gender identity.            Thank you!     Thank you for choosing [facility]  for your care. Our goal is always to provide you with excellent care. Hearing back from our patients is one way we can continue to improve our services. Please take a few minutes to complete the written survey that you may receive in the mail after your visit with us. Thank you!             Your Updated Medication List - Protect others around you: Learn how to safely use, store and throw away your medicines at www.disposemymeds.org.          This list is accurate as of 2/12/18  3:47 PM.  Always use your most recent med list.                   Brand Name Dispense Instructions for use Diagnosis    ALPRAZolam 0.5 MG tablet    XANAX    30 tablet    TAKE 1/2-1 TABLET BY MOUTH 3 TIMES A DAY AS NEEDED FOR ANXIETY    YUDI (generalized anxiety disorder)       busPIRone 5 MG tablet    BUSPAR    180 tablet    Take 1 tablet (5 mg) by mouth 2 times daily    YUDI (generalized anxiety disorder)       clindamycin 1 % topical gel    CLINDAMAX      APPLY TOPICALLY 2 TIMES DAILY.        traZODone 50 MG tablet    DESYREL    90 tablet    TAKE 1 TAB BY MOUTH DAILY AT BEDTIME    YUDI (generalized anxiety disorder), Primary insomnia       venlafaxine 75 MG 24 hr capsule    EFFEXOR-XR    90 capsule    Take 1 capsule (75 mg) by mouth daily    YUDI (generalized anxiety disorder)

## 2018-02-12 NOTE — NURSING NOTE
"Chief Complaint   Patient presents with     Recheck Medication       Initial /80 (BP Location: Right arm, Patient Position: Sitting, Cuff Size: Adult Large)  Pulse 73  Temp 98  F (36.7  C) (Oral)  Resp 14  Wt 79.3 kg (174 lb 12.8 oz)  LMP 02/02/2018  SpO2 99%  Breastfeeding? No  BMI 26.68 kg/m2 Estimated body mass index is 26.68 kg/(m^2) as calculated from the following:    Height as of 8/22/17: 1.724 m (5' 7.87\").    Weight as of this encounter: 79.3 kg (174 lb 12.8 oz).  Medication Reconciliation: complete     Thania Santana      "

## 2018-02-12 NOTE — PATIENT INSTRUCTIONS
Refill today.    Plan on doing two appointments a year for chronic problems. One for your physical exam and the other for a med recheck appointment.    Follow up in April for physical exam and X-ray.    Wear wide, supportive shoes. Avoid pointed shoes.    At [facility], we strive to deliver an exceptional experience to you, every time we see you.  If you receive a survey in the mail, please send us back your thoughts. We really do value your feedback.    Based on your medical history, these are the current health maintenance/preventive care services that you are due for (some may have been done at this visit.)  Health Maintenance Due   Topic Date Due     LIPID SCREEN Q5 YR FEMALE (SYSTEM ASSIGNED)  09/15/2015     INFLUENZA VACCINE (SYSTEM ASSIGNED)  09/01/2017         Suggested websites for health information:  Www.Sell My Timeshare NOW.[employer] : Up to date and easily searchable information on multiple topics.  Www.[employer].gov : medication info, interactive tutorials, watch real surgeries online  Www.familydoctor.org : good info from the Academy of Family Physicians  Www.cdc.gov : public health info, travel advisories, epidemics (H1N1)  Www.aap.org : children's health info, normal development, vaccinations  Www.health.[facility].[place].us : [place] dept of health, public health issues in [place], N1N1    Your care team:     Family Medicine   AUGUSTA Weaver MD Emily Bunt, SYL BRADY   S. MD Lucero Do MD Angela Wermerskirchen, MD         Clinic hours: Monday - Wednesday 7 am-7 pm   Thursdays and Fridays 7 am-5 pm.     [facility]: Monday - Friday 11 am-9 pm,   Saturday and Sunday 9 am-5 pm.    [facility]: Monday -Thursday 8 am-8 pm; Friday 8 am-6 pm; Saturday and Sunday 9 am-5 pm.     [facility]: Monday - Thursday 8 am - 7 pm; Friday 8 am - 6 pm    Clinic: [phone]   [facility]: [phone]   [facility]: [phone]

## 2018-02-13 ASSESSMENT — ANXIETY QUESTIONNAIRES: GAD7 TOTAL SCORE: 5

## 2018-02-13 ASSESSMENT — PATIENT HEALTH QUESTIONNAIRE - PHQ9: SUM OF ALL RESPONSES TO PHQ QUESTIONS 1-9: 3

## 2018-03-06 ENCOUNTER — OFFICE VISIT (OUTPATIENT)
Dept: FAMILY MEDICINE | Facility: CLINIC | Age: 48
End: 2018-03-06
Payer: COMMERCIAL

## 2018-03-06 VITALS
TEMPERATURE: 98.4 F | OXYGEN SATURATION: 100 % | SYSTOLIC BLOOD PRESSURE: 124 MMHG | HEART RATE: 68 BPM | BODY MASS INDEX: 26.52 KG/M2 | DIASTOLIC BLOOD PRESSURE: 82 MMHG | HEIGHT: 68 IN | RESPIRATION RATE: 17 BRPM | WEIGHT: 175 LBS

## 2018-03-06 DIAGNOSIS — J06.9 VIRAL URI: Primary | ICD-10-CM

## 2018-03-06 PROCEDURE — 99213 OFFICE O/P EST LOW 20 MIN: CPT | Performed by: PHYSICIAN ASSISTANT

## 2018-03-06 ASSESSMENT — PAIN SCALES - GENERAL: PAINLEVEL: SEVERE PAIN (6)

## 2018-03-06 NOTE — MR AVS SNAPSHOT
After Visit Summary   3/6/2018    Celina Ledesma    MRN: [id number]           Patient Information     Date Of Birth          1970        Visit Information        Provider Department      3/6/2018 10:40 AM Kat Nava PA-C [facility]        Today's Diagnoses     Viral URI    -  1      Care Instructions    At [facility], we strive to deliver an exceptional experience to you, every time we see you.  If you receive a survey in the mail, please send us back your thoughts. We really do value your feedback.    Suggested websites for health information:  Www.EyeQuant.[employer] : Up to date and easily searchable information on multiple topics.  Www.dELiAs.gov : medication info, interactive tutorials, watch real surgeries online  Www.familydoctor.org : good info from the Academy of Family Physicians  Www.cdc.gov : public health info, travel advisories, epidemics (H1N1)  Www.aap.org : children's health info, normal development, vaccinations  Www.health.[facility].[place].us : [place] dept of health, public health issues in [place], N1N1    Your care team:     Family Medicine   AUGUSTA Weaver MD Emily Bunt, APRN CNP   S. MD Lucero Do MD Angela Wermerskirchen, MD         Clinic hours: Monday - Wednesday 7 am-7 pm   Thursdays and Fridays 7 am-5 pm.     [facility]: Monday - Friday 11 am-9 pm,   Saturday and Sunday 9 am-5 pm.    [facility]: Monday -Thursday 8 am-8 pm; Friday 8 am-6 pm; Saturday and Sunday 9 am-5 pm.     [facility]: Monday - Thursday 8 am - 7 pm; Friday 8 am - 6 pm    Clinic: [phone]   [facility]: [phone]   [facility]: [phone]                  Follow-ups after your visit        Who to contact     If you have questions or need follow up information about today's clinic visit or your schedule please contact  "[facility] directly at [phone].  Normal or non-critical lab and imaging results will be communicated to you by MyChart, letter or phone within 4 business days after the clinic has received the results. If you do not hear from us within 7 days, please contact the clinic through Trendslidehart or phone. If you have a critical or abnormal lab result, we will notify you by phone as soon as possible.  Submit refill requests through [employer] or call your pharmacy and they will forward the refill request to us. Please allow 3 business days for your refill to be completed.          Additional Information About Your Visit        TrendslideharBramasol Information     [employer] gives you secure access to your electronic health record. If you see a primary care provider, you can also send messages to your care team and make appointments. If you have questions, please call your primary care clinic.  If you do not have a primary care provider, please call [phone] and they will assist you.        Care EveryWhere ID     This is your Care EveryWhere ID. This could be used by other organizations to access your [place] medical records  [id number]        Your Vitals Were     Pulse Temperature Respirations Height Last Period Pulse Oximetry    68 98.4  F (36.9  C) (Oral) 17 1.721 m (5' 7.75\") 03/04/2018 (Exact Date) 100%    Breastfeeding? BMI (Body Mass Index)                No 26.81 kg/m2           Blood Pressure from Last 3 Encounters:   03/06/18 124/82   02/12/18 124/80   08/22/17 126/86    Weight from Last 3 Encounters:   03/06/18 79.4 kg (175 lb)   02/12/18 79.3 kg (174 lb 12.8 oz)   08/22/17 73.7 kg (162 lb 8 oz)              Today, you had the following     No orders found for display       Primary Care Provider Office Phone # Fax #    Lucero Gonzalez [phone] [address]  [facility] [address]        Equal Access to Services     SARKIS HOFFMAN AH: Hadii anastasia Landis, " junie banerjee aprilshaggy marks ah. So [facility] [phone].    ATENCIÓN: Si spencer millard, tiene a quiñonez disposición servicios gratuitos de asistencia lingüística. Sheryl al [phone].    We comply with applicable federal civil rights laws and [place] laws. We do not discriminate on the basis of race, color, national origin, age, disability, sex, sexual orientation, or gender identity.            Thank you!     Thank you for choosing [facility]  for your care. Our goal is always to provide you with excellent care. Hearing back from our patients is one way we can continue to improve our services. Please take a few minutes to complete the written survey that you may receive in the mail after your visit with us. Thank you!             Your Updated Medication List - Protect others around you: Learn how to safely use, store and throw away your medicines at www.disposemymeds.org.          This list is accurate as of 3/6/18 11:08 AM.  Always use your most recent med list.                   Brand Name Dispense Instructions for use Diagnosis    ALPRAZolam 0.5 MG tablet    XANAX    30 tablet    TAKE 1/2-1 TABLET BY MOUTH 3 TIMES A DAY AS NEEDED FOR ANXIETY    YUDI (generalized anxiety disorder)       busPIRone 5 MG tablet    BUSPAR    180 tablet    Take 1 tablet (5 mg) by mouth 2 times daily    YUDI (generalized anxiety disorder)       clindamycin 1 % topical gel    CLINDAMAX     APPLY TOPICALLY 2 TIMES DAILY.        traZODone 50 MG tablet    DESYREL    90 tablet    TAKE 1 TAB BY MOUTH DAILY AT BEDTIME    YUDI (generalized anxiety disorder), Primary insomnia       venlafaxine 75 MG 24 hr capsule    EFFEXOR-XR    90 capsule    Take 1 capsule (75 mg) by mouth daily    YUDI (generalized anxiety disorder)

## 2018-03-06 NOTE — NURSING NOTE
"Chief Complaint   Patient presents with     Sinus Problem       Initial /82 (BP Location: Right arm, Patient Position: Chair, Cuff Size: Adult Large)  Pulse 68  Temp 98.4  F (36.9  C) (Oral)  Resp 17  Ht 1.721 m (5' 7.75\")  Wt 79.4 kg (175 lb)  LMP 03/04/2018 (Exact Date)  SpO2 100%  Breastfeeding? No  BMI 26.81 kg/m2 Estimated body mass index is 26.81 kg/(m^2) as calculated from the following:    Height as of this encounter: 1.721 m (5' 7.75\").    Weight as of this encounter: 79.4 kg (175 lb).  Medication Reconciliation: complete     Mera Miramontes MA       "

## 2018-03-06 NOTE — PROGRESS NOTES
SUBJECTIVE:   Celina Ledesma is a 47 year old female who presents to clinic today for the following health issues:      Acute Illness   Acute illness concerns: sinus  Onset: 5 days    Fever: no    Chills/Sweats: YES    Headache (location?): YES    Sinus Pressure:YES    Conjunctivitis:  no    Ear Pain: YES: fullness    Rhinorrhea: YES    Congestion: YES    Sore Throat: drainage     Cough: YES-non-productive    Wheeze: no    Decreased Appetite: no    Nausea: no    Vomiting: no    Diarrhea:  no    Dysuria/Freq.: no    Fatigue/Achiness: YES- fatigue    Sick/Strep Exposure: recently traveled [place]     Therapies Tried and outcome: tylenol sinus helps a little        Started with sore throat and then settled into sinuses,  Head pain, eye teeth and ears. Sore glands in neck.  No fever.  Coughing nonproductive.  Some shortness of breath with activity.  Stuff from nose clear.  For a time was a little green.  Still able to breathe through nose when sleeping.  Cough not keeping up at noc      Problem list and histories reviewed & adjusted, as indicated.  Additional history: as documented    Patient Active Problem List   Diagnosis     YUDI (generalized anxiety disorder)     Endometrial polyp     Lipoma of skin and subcutaneous tissue     History reviewed. No pertinent surgical history.    Social History   Substance Use Topics     Smoking status: Former Smoker     Quit date: 1/1/2000     Smokeless tobacco: Never Used     Alcohol use No     Family History   Problem Relation Age of Onset     DIABETES Father      HEART DISEASE Maternal Grandmother      HEART DISEASE Maternal Grandfather          Current Outpatient Prescriptions   Medication Sig Dispense Refill     traZODone (DESYREL) 50 MG tablet TAKE 1 TAB BY MOUTH DAILY AT BEDTIME 90 tablet 1     venlafaxine (EFFEXOR-XR) 75 MG 24 hr capsule Take 1 capsule (75 mg) by mouth daily 90 capsule 1     busPIRone (BUSPAR) 5 MG tablet Take 1 tablet (5 mg) by mouth 2 times  "daily 180 tablet 1     ALPRAZolam (XANAX) 0.5 MG tablet TAKE 1/2-1 TABLET BY MOUTH 3 TIMES A DAY AS NEEDED FOR ANXIETY 30 tablet 0     clindamycin (CLINDAMAX) 1 % gel APPLY TOPICALLY 2 TIMES DAILY.  6       Reviewed and updated as needed this visit by clinical staff  Tobacco  Allergies  Meds  Problems  Med Hx  Surg Hx  Fam Hx  Soc Hx        Reviewed and updated as needed this visit by Provider  Tobacco  Allergies  Meds  Problems  Med Hx  Surg Hx  Fam Hx  Soc Hx          ROS:  Constitutional, HEENT, cardiovascular, pulmonary, gi and gu systems are negative, except as otherwise noted.    OBJECTIVE:     /82 (BP Location: Right arm, Patient Position: Chair, Cuff Size: Adult Large)  Pulse 68  Temp 98.4  F (36.9  C) (Oral)  Resp 17  Ht 1.721 m (5' 7.75\")  Wt 79.4 kg (175 lb)  LMP 03/04/2018 (Exact Date)  SpO2 100%  Breastfeeding? No  BMI 26.81 kg/m2  Body mass index is 26.81 kg/(m^2).  GENERAL: healthy, alert and no distress  EYES: Eyes grossly normal to inspection, PERRL and conjunctivae and sclerae normal  HENT: normal cephalic/atraumatic, ear canals and TM's normal, nasal mucosa edematous , rhinorrhea clear, oropharynx clear and oral mucous membranes moist  NECK: no adenopathy, no asymmetry, masses, or scars and thyroid normal to palpation  RESP: lungs clear to auscultation - no rales, rhonchi or wheezes  CV: regular rate and rhythm, normal S1 S2, no S3 or S4, no murmur, click or rub, no peripheral edema and peripheral pulses strong  MS: no gross musculoskeletal defects noted, no edema    Diagnostic Test Results:  none     ASSESSMENT/PLAN:             1. Viral URI  Given constellation of symptoms and only symptomatic for five days feel viral infection.   Continue nettipot and over the counter med. Declined cough syrup.  Follow up with us if no improvement over the next week.  Return urgently if any change in symptoms.  Warning signs and symptoms warranting urgent evaluation reviewed. "           Kat Nava PA-C  [facility]

## 2018-03-06 NOTE — PATIENT INSTRUCTIONS
At [facility], we strive to deliver an exceptional experience to you, every time we see you.  If you receive a survey in the mail, please send us back your thoughts. We really do value your feedback.    Suggested websites for health information:  Www.[place].org : Up to date and easily searchable information on multiple topics.  Www.medlineplus.gov : medication info, interactive tutorials, watch real surgeries online  Www.familydoctor.org : good info from the Academy of Family Physicians  Www.cdc.gov : public health info, travel advisories, epidemics (H1N1)  Www.aap.org : children's health info, normal development, vaccinations  Www.health.[facility].[place].us : [place] dept of health, public health issues in [place], N1N1    Your care team:     Family Medicine   AUGUSTA Weaver MD Emily Bunt, SYL BRADY   S. MD Lucero Do MD Angela Wermerskirchen, MD         Clinic hours: Monday - Wednesday 7 am-7 pm   Thursdays and Fridays 7 am-5 pm.     [facility]: Monday - Friday 11 am-9 pm,   Saturday and Sunday 9 am-5 pm.    [facility]: Monday -Thursday 8 am-8 pm; Friday 8 am-6 pm; Saturday and Sunday 9 am-5 pm.     [facility]: Monday - Thursday 8 am - 7 pm; Friday 8 am - 6 pm    Clinic: [phone]   [facility]: [phone]   [facility]: [phone]

## 2018-04-05 ENCOUNTER — RADIANT APPOINTMENT (OUTPATIENT)
Dept: GENERAL RADIOLOGY | Facility: CLINIC | Age: 48
End: 2018-04-05
Attending: FAMILY MEDICINE
Payer: COMMERCIAL

## 2018-04-05 ENCOUNTER — OFFICE VISIT (OUTPATIENT)
Dept: FAMILY MEDICINE | Facility: CLINIC | Age: 48
End: 2018-04-05
Payer: COMMERCIAL

## 2018-04-05 VITALS
OXYGEN SATURATION: 99 % | SYSTOLIC BLOOD PRESSURE: 110 MMHG | WEIGHT: 170 LBS | DIASTOLIC BLOOD PRESSURE: 82 MMHG | RESPIRATION RATE: 16 BRPM | TEMPERATURE: 98.7 F | HEART RATE: 59 BPM | HEIGHT: 68 IN | BODY MASS INDEX: 25.76 KG/M2

## 2018-04-05 DIAGNOSIS — Z00.00 ROUTINE GENERAL MEDICAL EXAMINATION AT A HEALTH CARE FACILITY: ICD-10-CM

## 2018-04-05 DIAGNOSIS — F41.1 GAD (GENERALIZED ANXIETY DISORDER): ICD-10-CM

## 2018-04-05 DIAGNOSIS — Z00.00 ROUTINE GENERAL MEDICAL EXAMINATION AT A HEALTH CARE FACILITY: Primary | ICD-10-CM

## 2018-04-05 DIAGNOSIS — J98.4 LUNG DENSITY ON X-RAY: ICD-10-CM

## 2018-04-05 DIAGNOSIS — R09.89 CHRONIC THROAT CLEARING: ICD-10-CM

## 2018-04-05 LAB
ANION GAP SERPL CALCULATED.3IONS-SCNC: 5 MMOL/L (ref 3–14)
BUN SERPL-MCNC: 16 MG/DL (ref 7–30)
CALCIUM SERPL-MCNC: 9.1 MG/DL (ref 8.5–10.1)
CHLORIDE SERPL-SCNC: 102 MMOL/L (ref 94–109)
CHOLEST SERPL-MCNC: 181 MG/DL
CO2 SERPL-SCNC: 31 MMOL/L (ref 20–32)
CREAT SERPL-MCNC: 0.92 MG/DL (ref 0.52–1.04)
GFR SERPL CREATININE-BSD FRML MDRD: 65 ML/MIN/1.7M2
GLUCOSE SERPL-MCNC: 97 MG/DL (ref 70–99)
HDLC SERPL-MCNC: 57 MG/DL
HGB BLD-MCNC: 14.6 G/DL (ref 11.7–15.7)
LDLC SERPL CALC-MCNC: 99 MG/DL
NONHDLC SERPL-MCNC: 124 MG/DL
POTASSIUM SERPL-SCNC: 4.1 MMOL/L (ref 3.4–5.3)
SODIUM SERPL-SCNC: 138 MMOL/L (ref 133–144)
TRIGL SERPL-MCNC: 124 MG/DL

## 2018-04-05 PROCEDURE — 85018 HEMOGLOBIN: CPT | Performed by: FAMILY MEDICINE

## 2018-04-05 PROCEDURE — 36415 COLL VENOUS BLD VENIPUNCTURE: CPT | Performed by: FAMILY MEDICINE

## 2018-04-05 PROCEDURE — 99213 OFFICE O/P EST LOW 20 MIN: CPT | Mod: 25 | Performed by: FAMILY MEDICINE

## 2018-04-05 PROCEDURE — 80048 BASIC METABOLIC PNL TOTAL CA: CPT | Performed by: FAMILY MEDICINE

## 2018-04-05 PROCEDURE — 71046 X-RAY EXAM CHEST 2 VIEWS: CPT | Mod: FY

## 2018-04-05 PROCEDURE — 99396 PREV VISIT EST AGE 40-64: CPT | Performed by: FAMILY MEDICINE

## 2018-04-05 PROCEDURE — 80061 LIPID PANEL: CPT | Performed by: FAMILY MEDICINE

## 2018-04-05 RX ORDER — ALPRAZOLAM 0.5 MG
TABLET ORAL
Qty: 30 TABLET | Refills: 0 | Status: SHIPPED | OUTPATIENT
Start: 2018-04-05 | End: 2018-06-26
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
## 2018-04-05 NOTE — PATIENT INSTRUCTIONS
Refill Xanax today. Take as directed.     Labs today. I will notify you of results when I receive them.    For the clearing of the throat,  you can use claritin (loratidine) 10 mg OR zyrtec (cetrizine) 10 mg OR allegra (Fexofenadine) 180 mg. Each of these are dosed once daily.Take for 2 to 3 weeks, if symptoms improve you can continue to use this, If symptoms do not improve let me know for other recommendations.         Preventive Health Recommendations  Female Ages 40 to 49    Yearly exam:     See your health care provider every year in order to  1. Review health changes.   2. Discuss preventive care.    3. Review your medicines if your doctor prescribed any.      Get a Pap test every three years (unless you have an abnormal result and your provider advises testing more often).      If you get Pap tests with HPV test, you only need to test every 5 years, unless you have an abnormal result. You do not need a Pap test if your uterus was removed (hysterectomy) and you have not had cancer.      You should be tested each year for STDs (sexually transmitted diseases), if you're at risk.       Ask your doctor if you should have a mammogram.      Have a colonoscopy (test for colon cancer) if someone in your family has had colon cancer or polyps before age 50.       Have a cholesterol test every 5 years.       Have a diabetes test (fasting glucose) after age 45. If you are at risk for diabetes, you should have this test every 3 years.    Shots: Get a flu shot each year. Get a tetanus shot every 10 years.     Nutrition:     Eat at least 5 servings of fruits and vegetables each day.    Eat whole-grain bread, whole-wheat pasta and brown rice instead of white grains and rice.    Talk to your provider about Calcium and Vitamin D.     Lifestyle    Exercise at least 150 minutes a week (an average of 30 minutes a day, 5 days a week). This will help you control your weight and prevent disease.    Limit alcohol to one drink per  day.    No smoking.     Wear sunscreen to prevent skin cancer.    See your dentist every six months for an exam and cleaning.

## 2018-04-05 NOTE — MR AVS SNAPSHOT
After Visit Summary   4/5/2018    Celina Ledesma    MRN: [id number]           Patient Information     Date Of Birth          1970        Visit Information        Provider Department      4/5/2018 1:00 PM Lucero Gonzalez MD [facility]        Today's Diagnoses     Routine general medical examination at a health care facility    -  1    YUDI (generalized anxiety disorder)        Lung density on x-ray          Care Instructions    Refill Xanax today. Take as directed.     Labs today. I will notify you of results when I receive them.    For the clearing of the throat,  you can use claritin (loratidine) 10 mg OR zyrtec (cetrizine) 10 mg OR allegra (Fexofenadine) 180 mg. Each of these are dosed once daily.Take for 2 to 3 weeks, if symptoms improve you can continue to use this, If symptoms do not improve let me know for other recommendations.         Preventive Health Recommendations  Female Ages 40 to 49    Yearly exam:     See your health care provider every year in order to  1. Review health changes.   2. Discuss preventive care.    3. Review your medicines if your doctor prescribed any.      Get a Pap test every three years (unless you have an abnormal result and your provider advises testing more often).      If you get Pap tests with HPV test, you only need to test every 5 years, unless you have an abnormal result. You do not need a Pap test if your uterus was removed (hysterectomy) and you have not had cancer.      You should be tested each year for STDs (sexually transmitted diseases), if you're at risk.       Ask your doctor if you should have a mammogram.      Have a colonoscopy (test for colon cancer) if someone in your family has had colon cancer or polyps before age 50.       Have a cholesterol test every 5 years.       Have a diabetes test (fasting glucose) after age 45. If you are at risk for diabetes, you should have this test every 3 years.    Shots: Get a flu  shot each year. Get a tetanus shot every 10 years.     Nutrition:     Eat at least 5 servings of fruits and vegetables each day.    Eat whole-grain bread, whole-wheat pasta and brown rice instead of white grains and rice.    Talk to your provider about Calcium and Vitamin D.     Lifestyle    Exercise at least 150 minutes a week (an average of 30 minutes a day, 5 days a week). This will help you control your weight and prevent disease.    Limit alcohol to one drink per day.    No smoking.     Wear sunscreen to prevent skin cancer.    See your dentist every six months for an exam and cleaning.          Follow-ups after your visit        Who to contact     If you have questions or need follow up information about today's clinic visit or your schedule please contact [facility] directly at [phone].  Normal or non-critical lab and imaging results will be communicated to you by MyChart, letter or phone within 4 business days after the clinic has received the results. If you do not hear from us within 7 days, please contact the clinic through [employer] or phone. If you have a critical or abnormal lab result, we will notify you by phone as soon as possible.  Submit refill requests through [employer] or call your pharmacy and they will forward the refill request to us. Please allow 3 business days for your refill to be completed.          Additional Information About Your Visit        [employer] Information     [employer] gives you secure access to your electronic health record. If you see a primary care provider, you can also send messages to your care team and make appointments. If you have questions, please call your primary care clinic.  If you do not have a primary care provider, please call [phone] and they will assist you.        Care EveryWhere ID     This is your Care EveryWhere ID. This could be used by other organizations to access your [place] medical records  [id number]        Your Vitals Were   "   Pulse Temperature Respirations Height Last Period Pulse Oximetry    59 98.7  F (37.1  C) (Oral) 16 1.721 m (5' 7.75\") 04/02/2018 99%    BMI (Body Mass Index)                   26.04 kg/m2            Blood Pressure from Last 3 Encounters:   04/05/18 110/82   03/06/18 124/82   02/12/18 124/80    Weight from Last 3 Encounters:   04/05/18 77.1 kg (170 lb)   03/06/18 79.4 kg (175 lb)   02/12/18 79.3 kg (174 lb 12.8 oz)              We Performed the Following     Basic metabolic panel     Hemoglobin     Lipid panel reflex to direct LDL Fasting          Where to get your medicines      Some of these will need a paper prescription and others can be bought over the counter.  Ask your nurse if you have questions.     Bring a paper prescription for each of these medications     ALPRAZolam 0.5 MG tablet          Primary Care Provider Office Phone # Fax #    Lucero Gonzalez [phone] [address] [facility] [address]        Equal Access to Services     [facility]: Hadii dereje henriquez hadasho Somarlyn, waaxda luqadaha, qaybta kaalmada sergey, shaggy earl . So [facility] [phone].    ATENCIÓN: Si habla español, tiene a quiñonez disposición servicios gratuitos de asistencia lingüística. [facility] [phone].    We comply with applicable federal civil rights laws and [place] laws. We do not discriminate on the basis of race, color, national origin, age, disability, sex, sexual orientation, or gender identity.            Thank you!     Thank you for choosing [facility]  for your care. Our goal is always to provide you with excellent care. Hearing back from our patients is one way we can continue to improve our services. Please take a few minutes to complete the written survey that you may receive in the mail after your visit with us. Thank you!             Your Updated Medication List - Protect others around you: Learn how to safely use, store and throw away " your medicines at www.disposemymeds.org.          This list is accurate as of 4/5/18  1:47 PM.  Always use your most recent med list.                   Brand Name Dispense Instructions for use Diagnosis    ALPRAZolam 0.5 MG tablet    XANAX    30 tablet    TAKE 1/2-1 TABLET BY MOUTH 3 TIMES A DAY AS NEEDED FOR ANXIETY    YUDI (generalized anxiety disorder)       busPIRone 5 MG tablet    BUSPAR    180 tablet    Take 1 tablet (5 mg) by mouth 2 times daily    YUDI (generalized anxiety disorder)       clindamycin 1 % topical gel    CLINDAMAX     APPLY TOPICALLY 2 TIMES DAILY.        traZODone 50 MG tablet    DESYREL    90 tablet    TAKE 1 TAB BY MOUTH DAILY AT BEDTIME    YUDI (generalized anxiety disorder), Primary insomnia       venlafaxine 75 MG 24 hr capsule    EFFEXOR-XR    90 capsule    Take 1 capsule (75 mg) by mouth daily    YUDI (generalized anxiety disorder)

## 2018-04-06 ENCOUNTER — MYC MEDICAL ADVICE (OUTPATIENT)
Dept: FAMILY MEDICINE | Facility: CLINIC | Age: 48
End: 2018-04-06

## 2018-04-06 NOTE — TELEPHONE ENCOUNTER
Patient will need to sign Release of Information here and we can fax it to [facility] once completed.  Outside CD will have to be sent to [facility] for importing (cannot import images at [place]) and an order will have to be placed by Dr. Gonzalez for a comparison by our Radiologists.  Release of Information form placed at .    Left Message for patient.  Wait for call back.

## 2018-04-06 NOTE — PROGRESS NOTES
These blood tests are all normal.  Your cholesterol is under good control.  Your blood sugar and kidney function are normal.  Your hemoglobin is normal.  Please call or MyChart message me if you have any questions.   PENELOPE

## 2018-04-06 NOTE — TELEPHONE ENCOUNTER
Xray:   See radiology note from xray report - mentions getting the films from Cathi [facility]unch - can you assist her with getting a release of information signed for this.  I will send her a message to let her know someone will contact her.  Thanks,  PSK

## 2018-05-07 NOTE — PROGRESS NOTES
Celina:  Here are the comparison readings from the December xray and April 5, 2018.  There is no change in the appearance of the area from December to April.  This is reassuring but it does not fully give an answer to the cause of the nodule to begin with.  The CT scan would still be an option to further evaluate the area and give a different view of the lungs.    Let me know your thoughts on this, NATALIIAK

## 2018-06-26 DIAGNOSIS — F41.1 GAD (GENERALIZED ANXIETY DISORDER): ICD-10-CM

## 2018-06-26 NOTE — TELEPHONE ENCOUNTER
Requested Prescriptions   Pending Prescriptions Disp Refills     ALPRAZolam (XANAX) 0.5 MG tablet [Pharmacy Med Name: ALPRAZOLAM 0.5 MG TABLET]      Last Written Prescription Date:  4/5/18  Last Fill Quantity: 30 tablet,   # refills: 0  Last Office Visit: Dr. Gonzalez 04/05/18  Future Office visit:       Routing refill request to provider for review/approval because:  Drug not on the [facility], [facility] or  Health refill protocol or controlled substance   30 tablet 0     Sig: TAKE 1/2-1 TABLET BY MOUTH 3 TIMES A DAY AS NEEDED FOR ANXIETY    There is no refill protocol information for this order

## 2018-06-28 RX ORDER — ALPRAZOLAM 0.5 MG
TABLET ORAL
Qty: 30 TABLET | Refills: 0 | Status: SHIPPED | OUTPATIENT
Start: 2018-06-28 | End: 2018-11-12

## 2018-07-09 DIAGNOSIS — F41.1 GAD (GENERALIZED ANXIETY DISORDER): ICD-10-CM

## 2018-07-09 RX ORDER — ALPRAZOLAM 0.5 MG
TABLET ORAL
Qty: 30 TABLET | Refills: 0 | OUTPATIENT
Start: 2018-07-09

## 2018-07-09 NOTE — TELEPHONE ENCOUNTER
Requested Prescriptions   Pending Prescriptions Disp Refills     ALPRAZolam (XANAX) 0.5 MG tablet [Pharmacy Med Name: ALPRAZOLAM 0.5 MG TABLET] 30 tablet 0     Sig: TAKE 1/2-1 TABLET BY MOUTH 3 TIMES A DAY AS NEEDED FOR ANXIETY    There is no refill protocol information for this order        ALPRAZolam (XANAX) 0.5 MG tablet      Last Written Prescription Date:  6/28/18  Last Fill Quantity: 30,   # refills: 0  Last Office Visit: 4/5/18  Future Office visit:       Routing refill request to provider for review/approval because:  Drug not on the [facility], [facility] or [facility] refill protocol or controlled substance

## 2018-07-09 NOTE — TELEPHONE ENCOUNTER
Routing refill request to provider for review/approval because:  Drug not on the [facility] refill protocol   Prisca Lange RN

## 2018-07-10 NOTE — TELEPHONE ENCOUNTER
This was faxed on 6/28/18 (see 6/26/18 phone note)- call patient - from the [facility] site it does not look like she has picked this up.    Alternatively - call pharmacy to make sure they received script and if not refax to them.  Let me know if filled by patient.  Call her to verify if she is having worse anxiety prompting refill soon.      PENELOPE

## 2018-07-10 NOTE — TELEPHONE ENCOUNTER
TC to complete the verification of the 6/28/18 being received at pharmacy and if patient picked it up.    If yes then RN can call about symptoms.    Allyson Cole RN, [facility]

## 2018-07-10 NOTE — TELEPHONE ENCOUNTER
Spoke with pharmacy.  They state that they did not receive Rx faxed on 6/28/18.  Lat fill for xanax for pt was in April.    Rx re-faxed.    Mandie CARRILLO, Patient Care

## 2018-08-08 DIAGNOSIS — F41.1 GAD (GENERALIZED ANXIETY DISORDER): ICD-10-CM

## 2018-08-08 NOTE — TELEPHONE ENCOUNTER
"Requested Prescriptions   Pending Prescriptions Disp Refills     venlafaxine (EFFEXOR-XR) 75 MG 24 hr capsule [Pharmacy Med Name: VENLAFAXINE HCL ER 75 MG CAP] 90 capsule 1     Sig: TAKE 1 CAPSULE (75 MG) BY MOUTH DAILY    Serotonin-Norepinephrine Reuptake Inhibitors  Passed    8/8/2018  4:56 PM       Passed - Blood pressure under 140/90 in past 12 months    BP Readings from Last 3 Encounters:   04/05/18 110/82   03/06/18 124/82   02/12/18 124/80                Passed - Recent (12 mo) or future (30 days) visit within the authorizing provider's specialty    Patient had office visit in the last 12 months or has a visit in the next 30 days with authorizing provider or within the authorizing provider's specialty.  See \"Patient Info\" tab in inbasket, or \"Choose Columns\" in Meds & Orders section of the refill encounter.           Passed - Patient is age 18 or older       Passed - No active pregnancy on record       Passed - Normal serum creatinine on file in past 12 months    Recent Labs   Lab Test  04/05/18   1328   CR  0.92            Passed - No positive pregnancy test in past 12 months        venlafaxine (EFFEXOR-XR) 75 MG 24 hr capsule  Last Written Prescription Date:  2/12/18  Last Fill Quantity: 90,  # refills: 1   Last office visit: 4/5/2018 with prescribing provider:  Dr. Gonzalez   Future Office Visit:      "

## 2018-08-09 RX ORDER — VENLAFAXINE HYDROCHLORIDE 75 MG/1
CAPSULE, EXTENDED RELEASE ORAL
Qty: 90 CAPSULE | Refills: 1 | Status: SHIPPED | OUTPATIENT
Start: 2018-08-09 | End: 2018-10-22

## 2018-08-09 NOTE — TELEPHONE ENCOUNTER
Prescription approved per [facility] Refill Protocol.    Cat Xiong RN  [facility]

## 2018-08-15 DIAGNOSIS — F51.01 PRIMARY INSOMNIA: ICD-10-CM

## 2018-08-15 DIAGNOSIS — F41.1 GAD (GENERALIZED ANXIETY DISORDER): ICD-10-CM

## 2018-08-15 RX ORDER — BUSPIRONE HYDROCHLORIDE 5 MG/1
TABLET ORAL
Qty: 180 TABLET | Refills: 0 | Status: SHIPPED | OUTPATIENT
Start: 2018-08-15 | End: 2018-10-22

## 2018-08-15 RX ORDER — TRAZODONE HYDROCHLORIDE 50 MG/1
TABLET, FILM COATED ORAL
Qty: 90 TABLET | Refills: 0 | Status: SHIPPED | OUTPATIENT
Start: 2018-08-15 | End: 2018-10-22

## 2018-08-15 NOTE — TELEPHONE ENCOUNTER
Prescription approved per [facility] Refill Protocol.  Per provider plan from [facility] on 4/5/18: Instructions  Return in about 6 months (around 10/5/2018) for recheck mood.     Cat Xiong RN  [facility] Triage

## 2018-08-15 NOTE — TELEPHONE ENCOUNTER
"Requested Prescriptions   Pending Prescriptions Disp Refills     busPIRone (BUSPAR) 5 MG tablet [Pharmacy Med Name: BUSPIRONE HCL 5 MG TABLET] 180 tablet 1     Sig: TAKE 1 TABLET (5 MG) BY MOUTH 2 TIMES DAILY    Atypical Antidepressants Protocol Passed    8/15/2018  1:37 AM       Passed - Recent (12 mo) or future (30 days) visit within the authorizing provider's specialty    Patient had office visit in the last 12 months or has a visit in the next 30 days with authorizing provider or within the authorizing provider's specialty.  See \"Patient Info\" tab in inbasket, or \"Choose Columns\" in Meds & Orders section of the refill encounter.           Passed - Patient is age 18 or older       Passed - No active pregnancy on record       Passed - No positive pregnancy test in past 12 mos        traZODone (DESYREL) 50 MG tablet [Pharmacy Med Name: TRAZODONE 50 MG TABLET] 90 tablet 1     Sig: TAKE 1 TAB BY MOUTH DAILY AT BEDTIME    Serotonin Modulators Passed    8/15/2018  1:37 AM       Passed - Recent (12 mo) or future (30 days) visit within the authorizing provider's specialty    Patient had office visit in the last 12 months or has a visit in the next 30 days with authorizing provider or within the authorizing provider's specialty.  See \"Patient Info\" tab in inbasket, or \"Choose Columns\" in Meds & Orders section of the refill encounter.           Passed - Patient is age 18 or older       Passed - No active pregnancy on record       Passed - No positive pregnancy test in past 12 months        busPIRone (BUSPAR) 5 MG tablet  Last Written Prescription Date:  2/12/18  Last Fill Quantity: 180,  # refills: 1   Last office visit: 4/5/2018 with prescribing provider:  Dr. Gonzalez   Future Office Visit:        traZODone (DESYREL) 50 MG tablet  Last Written Prescription Date:  2/12/18  Last Fill Quantity: 90,  # refills: 1   Last office visit: 4/5/2018 with prescribing provider:  Dr. Gonzalez   Future Office Visit:      "

## 2018-10-22 ENCOUNTER — OFFICE VISIT (OUTPATIENT)
Dept: FAMILY MEDICINE | Facility: CLINIC | Age: 48
End: 2018-10-22
Payer: COMMERCIAL

## 2018-10-22 VITALS
HEART RATE: 75 BPM | HEIGHT: 68 IN | DIASTOLIC BLOOD PRESSURE: 84 MMHG | WEIGHT: 179 LBS | SYSTOLIC BLOOD PRESSURE: 137 MMHG | RESPIRATION RATE: 16 BRPM | TEMPERATURE: 98.6 F | BODY MASS INDEX: 27.13 KG/M2 | OXYGEN SATURATION: 99 %

## 2018-10-22 DIAGNOSIS — F51.01 PRIMARY INSOMNIA: ICD-10-CM

## 2018-10-22 DIAGNOSIS — R91.1 NODULE OF RIGHT LUNG: ICD-10-CM

## 2018-10-22 DIAGNOSIS — M21.612 BUNION, LEFT: ICD-10-CM

## 2018-10-22 DIAGNOSIS — F41.1 GAD (GENERALIZED ANXIETY DISORDER): Primary | ICD-10-CM

## 2018-10-22 PROCEDURE — 99214 OFFICE O/P EST MOD 30 MIN: CPT | Performed by: FAMILY MEDICINE

## 2018-10-22 RX ORDER — BUSPIRONE HYDROCHLORIDE 5 MG/1
TABLET ORAL
Qty: 180 TABLET | Refills: 1 | Status: SHIPPED | OUTPATIENT
Start: 2018-10-22 | End: 2019-05-07

## 2018-10-22 RX ORDER — TRAZODONE HYDROCHLORIDE 50 MG/1
TABLET, FILM COATED ORAL
Qty: 90 TABLET | Refills: 1 | Status: SHIPPED | OUTPATIENT
Start: 2018-10-22 | End: 2019-05-06

## 2018-10-22 RX ORDER — VENLAFAXINE HYDROCHLORIDE 75 MG/1
CAPSULE, EXTENDED RELEASE ORAL
Qty: 90 CAPSULE | Refills: 1 | Status: SHIPPED | OUTPATIENT
Start: 2018-10-22 | End: 2019-05-07

## 2018-10-22 ASSESSMENT — ANXIETY QUESTIONNAIRES
3. WORRYING TOO MUCH ABOUT DIFFERENT THINGS: SEVERAL DAYS
7. FEELING AFRAID AS IF SOMETHING AWFUL MIGHT HAPPEN: SEVERAL DAYS
5. BEING SO RESTLESS THAT IT IS HARD TO SIT STILL: NOT AT ALL
1. FEELING NERVOUS, ANXIOUS, OR ON EDGE: SEVERAL DAYS
6. BECOMING EASILY ANNOYED OR IRRITABLE: SEVERAL DAYS
IF YOU CHECKED OFF ANY PROBLEMS ON THIS QUESTIONNAIRE, HOW DIFFICULT HAVE THESE PROBLEMS MADE IT FOR YOU TO DO YOUR WORK, TAKE CARE OF THINGS AT HOME, OR GET ALONG WITH OTHER PEOPLE: SOMEWHAT DIFFICULT
2. NOT BEING ABLE TO STOP OR CONTROL WORRYING: SEVERAL DAYS
GAD7 TOTAL SCORE: 6

## 2018-10-22 ASSESSMENT — PATIENT HEALTH QUESTIONNAIRE - PHQ9: 5. POOR APPETITE OR OVEREATING: SEVERAL DAYS

## 2018-10-22 NOTE — PATIENT INSTRUCTIONS
You can take 2999-6038 IU of Vitamin D.  Notify me if aching discomfort is not resolved.    Try wearing wide toed shoes for the bunions on your feet. A referral for Podiatry has been given to you.   You can call [phone] to schedule this at the [facility] (formerly the [facility]      Your refills have been ordered.  Call when alprazolam is needed for refill.    Follow up of the nodule on lung xray with CT scan as previously discussed.   You can call [phone] to schedule this at the [facility] (formerly the [facility]).      Begin the Clindamycin for the skin/acne.  Follow up if not controlling symptoms adequately.

## 2018-10-22 NOTE — PROGRESS NOTES
"  SUBJECTIVE:   Celina Ledesma is a 48 year old female who presents to clinic today for the following health issues:      Anxiety Follow-Up    Status since last visit: No change    Other associated symptoms:None    Complicating factors:   Significant life event: No   Current substance abuse: None  Depression symptoms: No  YUDI-7 SCORE 8/22/2017 2/12/2018 10/22/2018   Total Score 3 5 6     PHQ-9 SCORE 8/22/2017 2/12/2018 10/22/2018   Total Score 0 3 2       YUDI-7    Amount of exercise or physical activity: 4-5 days/week for an average of 45-60 minutes    Problems taking medications regularly: No    Medication side effects: none    Diet: regular (no restrictions)    The patient states that she is doing well with her medications and she has no complaints. She states that if she has a lot of stuff to do, she will get anxiety.    Acne   The patient also complains of having a lot of acne on her chin. She states that she experiences this a lot during the Fall. She also states that she has a lot of chin hair.     Body aches  The patient states that her entire body is sore. She states that her knees feel \"crunchy\", and she has bunions on her feet as well.  The patient complains of having tennis elbow in both of her arms.    Abnormal chest xray  Accession # [id number]     The original report on this patient was dictated by Filiberto Day.       As of May 4, 2018, a comparison radiograph is submitted for review,  performed December 4, 2017. A nodule is identified in the right upper  lobe measuring 7 mm, overlying the posterolateral right fourth rib and  unchanged in size.     Filiberto Day ( Date of Addendum: May 4, 2018 )     FILIBERTO DAY MD   additional evaluation with CT scan planned for the nodule RUL.  She did not get around to getting the CT scan. Planning to do this in the near future. No symptoms, no cough, no SOB, no CP.    Problem list and histories reviewed & adjusted, as indicated.  Additional history: as " "documented    BP Readings from Last 3 Encounters:   10/22/18 137/84   04/05/18 110/82   03/06/18 124/82    Wt Readings from Last 3 Encounters:   10/22/18 81.2 kg (179 lb)   04/05/18 77.1 kg (170 lb)   03/06/18 79.4 kg (175 lb)         Reviewed and updated as needed this visit by clinical staff  Tobacco  Allergies  Meds  Med Hx  Surg Hx  Fam Hx  Soc Hx      Reviewed and updated as needed this visit by Provider  Tobacco  Allergies  Meds  Med Hx  Surg Hx  Fam Hx  Soc Hx        ROS:  Constitutional, HEENT, cardiovascular, pulmonary, GI, , musculoskeletal, neuro, skin, endocrine and psych systems are negative, except as otherwise noted.    This document serves as a record of the services and decisions personally performed and made by Lucero Gonzalez MD. It was created on her behalf by Trish Velarde, a trained medical scribe. The creation of this document is based the provider's statements to the medical scribe.    Trish Velarde October 22, 2018 6:42 PM  OBJECTIVE:     /84 (BP Location: Right arm, Patient Position: Sitting, Cuff Size: Adult Regular)  Pulse 75  Temp 98.6  F (37  C) (Oral)  Resp 16  Ht 1.721 m (5' 7.75\")  Wt 81.2 kg (179 lb)  SpO2 99%  BMI 27.42 kg/m2  Body mass index is 27.42 kg/(m^2).  GENERAL: Overweight, alert and no distress  RESP: lungs clear to auscultation - no rales, rhonchi or wheezes  CV: regular rate and rhythm, normal S1 S2, no S3 or S4, no murmur, click or rub, no peripheral edema and peripheral pulses strong  MS: Bunion bilateral - mild tenderness left, no skin breakdown or erythema  SKIN: no suspicious lesions or rashes.  Cystic acne along chin line. No abscess or fluctuance noted.  BACK: no CVA tenderness, no paralumbar tenderness  PSYCH:  Affect normal.  No overt anxiety or depression noted.        ASSESSMENT/PLAN:     BMI:   Estimated body mass index is 27.42 kg/(m^2) as calculated from the following:    Height as of this encounter: 1.721 m (5' 7.75\").    " Weight as of this encounter: 81.2 kg (179 lb).   Weight management plan: Discussed healthy diet and exercise guidelines and patient will follow up in 12 months in clinic to re-evaluate.    1. YUDI (generalized anxiety disorder)  Doing well. Well controlled. Tolerating medication.  No change in plan.  Begin Vitamin D supplementation  Refills have been ordered.   - venlafaxine (EFFEXOR-XR) 75 MG 24 hr capsule; TAKE 1 CAPSULE (75 MG) BY MOUTH DAILY  Dispense: 90 capsule; Refill: 1  - traZODone (DESYREL) 50 MG tablet; TAKE 1 TAB BY MOUTH DAILY AT BEDTIME  Dispense: 90 tablet; Refill: 1  - busPIRone (BUSPAR) 5 MG tablet; TAKE 1 TABLET (5 MG) BY MOUTH 2 TIMES DAILY  Dispense: 180 tablet; Refill: 1    2. Nodule of right lung  CT has been ordered.  Patient will schedule appointment.  - CT Chest w/o Contrast; Future       3. Bunion, left  Discussed plan with patient.  Referral for Podiatry has been given.  Patient will schedule appointment.  - PODIATRY/FOOT & ANKLE SURGERY REFERRAL    4. Primary insomnia  Doing well. Well controlled. Tolerating medication.  No change in plan.   Refills have been ordered.  - traZODone (DESYREL) 50 MG tablet; TAKE 1 TAB BY MOUTH DAILY AT BEDTIME  Dispense: 90 tablet; Refill: 1      Patient instructions discussed with patient  Patient Instructions   You can take 6421-4804 IU of Vitamin D.  Notify me if aching discomfort is not resolved.    Try wearing wide toed shoes for the bunions on your feet. A referral for Podiatry has been given to you.   You can call [phone] to schedule this at the [facility] (formerly the [facility]      Your refills have been ordered.  Call when alprazolam is needed for refill.    Follow up of the nodule on lung xray with CT scan as previously discussed.   You can call [phone] to schedule this at the [facility] ([facility]      Begin  the Clindamycin for the skin/acne.  Follow up if not controlling symptoms adequately.        The information in this document, created by the medical scribe for me, accurately reflects the services I personally performed and the decisions made by me. I have reviewed and approved this document for accuracy prior to leaving the patient care area.    Lucero Gonzalez MD  [facility]

## 2018-10-22 NOTE — MR AVS SNAPSHOT
After Visit Summary   10/22/2018    Celina Ledesma    MRN: [id number]           Patient Information     Date Of Birth          1970        Visit Information        Provider Department      10/22/2018 6:20 PM Lucero Gonzalez MD [facility]        Today's Diagnoses     Bunion, left    -  1    YUDI (generalized anxiety disorder)        Nodule of right lung        Primary insomnia          Care Instructions    You can take 4803-5068 IU of Vitamin D.  Notify me if aching discomfort is not resolved.    Try wearing wide toed shoes for the bunions on your feet. A referral for Podiatry has been given to you.   You can call [phone] to schedule this at the [facility] (formerly the [facility]      Your refills have been ordered.  Call when alprazolam is needed for refill.    Follow up of the nodule on lung xray with CT scan as previously discussed.   You can call [phone] to schedule this at the [facility] ([facility]      Begin the Clindamycin for the skin/acne.  Follow up if not controlling symptoms adequately.            Follow-ups after your visit        Additional Services     PODIATRY/FOOT & ANKLE SURGERY REFERRAL       Your provider has referred you to: [facility]: [facility]: [id number] [place] [address] Patient Clinic Line: [phone]     Please be aware that coverage of these services is subject to the terms and limitations of your health insurance plan.  Call member services at your health plan with any benefit or coverage questions.      Please bring the following to your appointment:  >>   Any x-rays, CTs or MRIs which have been performed.  Contact the facility where they were done to arrange for  prior to your scheduled appointment.    >>   List of current medications   >>   This referral request   >>   Any  "documents/labs given to you for this referral                  Future tests that were ordered for you today     Open Future Orders        Priority Expected Expires Ordered    CT Chest w/o Contrast Routine  10/22/2019 10/22/2018            Who to contact     If you have questions or need follow up information about today's clinic visit or your schedule please contact [facility] directly at [phone].  Normal or non-critical lab and imaging results will be communicated to you by MyChart, letter or phone within 4 business days after the clinic has received the results. If you do not hear from us within 7 days, please contact the clinic through W[employer]book[employer] or phone. If you have a critical or abnormal lab result, we will notify you by phone as soon as possible.  Submit refill requests through [employer] or call your pharmacy and they will forward the refill request to us. Please allow 3 business days for your refill to be completed.          Additional Information About Your Visit        W[employer]book[employer] Information     [employer] gives you secure access to your electronic health record. If you see a primary care provider, you can also send messages to your care team and make appointments. If you have questions, please call your primary care clinic.  If you do not have a primary care provider, please call [phone] and they will assist you.        Care EveryWhere ID     This is your Care EveryWhere ID. This could be used by other organizations to access your [facility] medical records  [id number]        Your Vitals Were     Pulse Temperature Respirations Height Pulse Oximetry BMI (Body Mass Index)    75 98.6  F (37  C) (Oral) 16 1.721 m (5' 7.75\") 99% 27.42 kg/m2       Blood Pressure from Last 3 Encounters:   10/22/18 137/84   04/05/18 110/82   03/06/18 124/82    Weight from Last 3 Encounters:   10/22/18 81.2 kg (179 lb)   04/05/18 77.1 kg (170 lb)   03/06/18 79.4 kg (175 lb)              We Performed the Following  "    PODIATRY/FOOT & ANKLE SURGERY REFERRAL          Where to get your medicines      These medications were sent to [facility]/pharmacy #[id number] - [place], [place] - [address]  [address], [place] [address]     Phone:  [phone]     busPIRone 5 MG tablet    traZODone 50 MG tablet    venlafaxine 75 MG 24 hr capsule          Primary Care Provider Office Phone # Fax #    Lucero Gonzalez [phone] [address]  [facility] [address]        Equal Access to Services     [facility]: Hadii aad ku hadasho Soomaali, waaxda luqadaha, qaybta kaalmada adeegyada, waxay idiin hayaan adeeg kharasolis lagal . So [facility] [phone].    ATENCIÓN: Si habla español, tiene a quiñonez disposición servicios gratuitos de asistencia lingüística. [facility] [phone].    We comply with applicable federal civil rights laws and [place] laws. We do not discriminate on the basis of race, color, national origin, age, disability, sex, sexual orientation, or gender identity.            Thank you!     Thank you for choosing [facility]  for your care. Our goal is always to provide you with excellent care. Hearing back from our patients is one way we can continue to improve our services. Please take a few minutes to complete the written survey that you may receive in the mail after your visit with us. Thank you!             Your Updated Medication List - Protect others around you: Learn how to safely use, store and throw away your medicines at www.disposemymeds.org.          This list is accurate as of 10/22/18  7:00 PM.  Always use your most recent med list.                   Brand Name Dispense Instructions for use Diagnosis    ALPRAZolam 0.5 MG tablet    XANAX    30 tablet    TAKE 1/2-1 TABLET BY MOUTH 3 TIMES A DAY AS NEEDED FOR ANXIETY    YUDI (generalized anxiety disorder)       busPIRone 5 MG tablet    BUSPAR    180 tablet    TAKE 1 TABLET (5 MG) BY MOUTH 2 TIMES DAILY    YUDI (generalized  anxiety disorder)       clindamycin 1 % topical gel    CLINDAMAX     APPLY TOPICALLY 2 TIMES DAILY.        traZODone 50 MG tablet    DESYREL    90 tablet    TAKE 1 TAB BY MOUTH DAILY AT BEDTIME    YUDI (generalized anxiety disorder), Primary insomnia       venlafaxine 75 MG 24 hr capsule    EFFEXOR-XR    90 capsule    TAKE 1 CAPSULE (75 MG) BY MOUTH DAILY    YUDI (generalized anxiety disorder)

## 2018-10-23 ASSESSMENT — ANXIETY QUESTIONNAIRES: GAD7 TOTAL SCORE: 6

## 2018-10-23 ASSESSMENT — PATIENT HEALTH QUESTIONNAIRE - PHQ9: SUM OF ALL RESPONSES TO PHQ QUESTIONS 1-9: 2

## 2018-10-26 ENCOUNTER — RADIANT APPOINTMENT (OUTPATIENT)
Dept: CT IMAGING | Facility: CLINIC | Age: 48
End: 2018-10-26
Attending: FAMILY MEDICINE
Payer: COMMERCIAL

## 2018-10-26 DIAGNOSIS — R91.1 NODULE OF RIGHT LUNG: ICD-10-CM

## 2018-10-26 LAB — RADIOLOGIST FLAGS: NORMAL

## 2018-10-26 PROCEDURE — 71250 CT THORAX DX C-: CPT | Performed by: RADIOLOGY

## 2018-10-30 ENCOUNTER — RADIANT APPOINTMENT (OUTPATIENT)
Dept: GENERAL RADIOLOGY | Facility: OTHER | Age: 48
End: 2018-10-30
Attending: PODIATRIST
Payer: COMMERCIAL

## 2018-10-30 ENCOUNTER — OFFICE VISIT (OUTPATIENT)
Dept: PODIATRY | Facility: OTHER | Age: 48
End: 2018-10-30
Payer: COMMERCIAL

## 2018-10-30 VITALS
BODY MASS INDEX: 27.13 KG/M2 | DIASTOLIC BLOOD PRESSURE: 80 MMHG | SYSTOLIC BLOOD PRESSURE: 112 MMHG | WEIGHT: 179 LBS | HEIGHT: 68 IN

## 2018-10-30 DIAGNOSIS — M77.8 CAPSULITIS OF FOOT, LEFT: Primary | ICD-10-CM

## 2018-10-30 DIAGNOSIS — M20.11 HALLUX VALGUS, ACQUIRED, BILATERAL: ICD-10-CM

## 2018-10-30 DIAGNOSIS — M20.12 HALLUX VALGUS, ACQUIRED, BILATERAL: ICD-10-CM

## 2018-10-30 DIAGNOSIS — M20.5X2 HALLUX LIMITUS OF LEFT FOOT: ICD-10-CM

## 2018-10-30 PROCEDURE — 73630 X-RAY EXAM OF FOOT: CPT | Mod: LT

## 2018-10-30 PROCEDURE — 99243 OFF/OP CNSLTJ NEW/EST LOW 30: CPT | Performed by: PODIATRIST

## 2018-10-30 RX ORDER — DICLOFENAC SODIUM 75 MG/1
75 TABLET, DELAYED RELEASE ORAL 2 TIMES DAILY
Qty: 60 TABLET | Refills: 1 | Status: SHIPPED | OUTPATIENT
Start: 2018-10-30 | End: 2019-05-07

## 2018-10-30 ASSESSMENT — PAIN SCALES - GENERAL: PAINLEVEL: MODERATE PAIN (5)

## 2018-10-30 NOTE — LETTER
10/30/2018         RE: Celina Ledesma  [address] [place] [address]        Dear Colleague,    Thank you for referring your patient, Celina Ledesma, to the [facility]. Please see a copy of my visit note below.    HPI:  Celina Ledesma is a 48 year old female who is seen in consultation at the request of Lucero Gonzalez MD    Pt presents for eval of:   (Onset, Location, L/R, Character, Treatments, Injury if yes)    XR Left and Right foot today, 10/30/2018    Presents today with supportive athletic shoes     1. 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  Constant, dull ache, swelling, pain 5  Soft insoles, OTC inserts, ice, ibuprofen, toe spacer    2. Ongoing medial Left and Right bunion discomfort    Works as a .    Weight management plan: Patient was referred to their PCP to discuss a diet and exercise plan.     Patient to follow up with Primary Care provider regarding elevated blood pressure.    ROS:  10 point ROS neg other than the symptoms noted above in the HPI.    Patient Active Problem List   Diagnosis     YUDI (generalized anxiety disorder)     Endometrial polyp     Lipoma of skin and subcutaneous tissue       PAST MEDICAL HISTORY: History reviewed. No pertinent past medical history.     PAST SURGICAL HISTORY: History reviewed. No pertinent surgical history.     MEDICATIONS:   Current Outpatient Prescriptions:      ALPRAZolam (XANAX) 0.5 MG tablet, TAKE 1/2-1 TABLET BY MOUTH 3 TIMES A DAY AS NEEDED FOR ANXIETY, Disp: 30 tablet, Rfl: 0     busPIRone (BUSPAR) 5 MG tablet, TAKE 1 TABLET (5 MG) BY MOUTH 2 TIMES DAILY, Disp: 180 tablet, Rfl: 1     clindamycin (CLINDAMAX) 1 % gel, APPLY TOPICALLY 2 TIMES DAILY., Disp: , Rfl: 6     diclofenac (VOLTAREN) 75 MG EC tablet, Take 1 tablet (75 mg) by mouth 2 times daily, Disp: 60 tablet, Rfl: 1     traZODone (DESYREL) 50 MG tablet,  "TAKE 1 TAB BY MOUTH DAILY AT BEDTIME, Disp: 90 tablet, Rfl: 1     venlafaxine (EFFEXOR-XR) 75 MG 24 hr capsule, TAKE 1 CAPSULE (75 MG) BY MOUTH DAILY, Disp: 90 capsule, Rfl: 1     ALLERGIES:    Allergies   Allergen Reactions     Morphine Other (See Comments)     Risk for impact on the sphincter chloé so told not to take it.     Seasonal Allergies         SOCIAL HISTORY:   Social History     Social History     Marital status:      Spouse name: N/A     Number of children: N/A     Years of education: N/A     Occupational History     Not on file.     Social History Main Topics     Smoking status: Former Smoker     Quit date: 1/1/2000     Smokeless tobacco: Never Used     Alcohol use No     Drug use: No     Sexual activity: Yes     Partners: Male     Other Topics Concern     Not on file     Social History Narrative        FAMILY HISTORY:   Family History   Problem Relation Age of Onset     Diabetes Father      HEART DISEASE Maternal Grandmother      HEART DISEASE Maternal Grandfather         EXAM:Vitals: /80 (BP Location: Right arm, Cuff Size: Adult Regular)  Ht 5' 7.75\" (1.721 m)  Wt 179 lb (81.2 kg)  BMI 27.42 kg/m2  BMI= Body mass index is 27.42 kg/(m^2).    General appearance: Patient is alert and fully cooperative with history & exam.  No sign of distress is noted during the visit.     Psychiatric: Affect is pleasant & appropriate.  Patient appears motivated to improve health.     Respiratory: Breathing is regular & unlabored while sitting.     HEENT: Hearing is intact to spoken word.  Speech is clear.  No gross evidence of visual impairment that would impact ambulation.     Vascular: DP & PT pulses are intact & regular bilaterally.  No significant edema or varicosities noted.  CFT and skin temperature is normal to both lower extremities.     Neurologic: Lower extremity sensation is intact to light touch.  No evidence of weakness or contracture in the lower extremities.  No evidence of " neuropathy.    Dermatologic: Skin is intact to both lower extremities with adequate texture, turgor and tone about the integument.  No paronychia or evidence of soft tissue infection is noted.     Musculoskeletal: Patient is ambulatory without assistive device or brace.  No gross ankle deformity noted.  Most symptoms are noted with firm palpation of the left greater than right second metatarsal phalangeal joint.  There is no palpable edema.  Negative drawer maneuver of the MPJ.  There is limited hallux dorsiflexion noted bilateral.  About 50 degrees total dorsiflexion with subtle tracking.  No crepitus throughout range of motion.  No erythema or heat.    Radiographs: 3 views bilateral demonstrate square or rigid first metatarsal head shape.  Mildly abnormal metatarsal length parabola.  Subtle medial deviation of the lesser digits at the MPJs.     ASSESSMENT:       ICD-10-CM    1. Capsulitis of foot, left M77.52 XR Foot Bilateral G/E 3 Views     diclofenac (VOLTAREN) 75 MG EC tablet   2. Hallux limitus of left foot M20.5X2 XR Foot Bilateral G/E 3 Views     diclofenac (VOLTAREN) 75 MG EC tablet        PLAN:  Reviewed patient's chart in [facility].      10/30/2018   Patient's biomechanical foot structure causes medial deviation of the lesser metatarsal phalangeal joints.  Hallux limitus contributes to this as well.  We discussed that she is only had symptoms for a short period of time therefore education understanding about proper training and appropriate shoe gear.  Begin oral anti-inflammatory.  Discussed injections and custom molded orthotics.  She would like to attempt tablets and changes in activity and OTC inserts first.  If this continues to remain symptomatic I would recommend custom orthotics at that time.  All questions were answered throughout the visit.    In addition to the above history and physical exam, approximately 40 minutes of time was spent with the patient, greater than 50% directly with the patient,  counseling, educating, motivational interviewing and coordinating care in regards to the above noted multiple diagnoses in addition to performing the documented procedure.  We have reviewed diagnostic results, discussed prognosis and risks and benefits of treatment options.          Erickson Garibay DPM      Again, thank you for allowing me to participate in the care of your patient.        Sincerely,        Erickson Garibay DPM

## 2018-10-30 NOTE — PROGRESS NOTES
HPI:  Celina Ledesma is a 48 year old female who is seen in consultation at the request of Lucero Gonzalez MD    Pt presents for eval of:   (Onset, Location, L/R, Character, Treatments, Injury if yes)    XR Left and Right foot today, 10/30/2018    Presents today with supportive athletic shoes     1. 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  Constant, dull ache, swelling, pain 5  Soft insoles, OTC inserts, ice, ibuprofen, toe spacer    2. Ongoing medial Left and Right bunion discomfort    Works as a .    Weight management plan: Patient was referred to their PCP to discuss a diet and exercise plan.     Patient to follow up with Primary Care provider regarding elevated blood pressure.    ROS:  10 point ROS neg other than the symptoms noted above in the HPI.    Patient Active Problem List   Diagnosis     YUDI (generalized anxiety disorder)     Endometrial polyp     Lipoma of skin and subcutaneous tissue       PAST MEDICAL HISTORY: History reviewed. No pertinent past medical history.     PAST SURGICAL HISTORY: History reviewed. No pertinent surgical history.     MEDICATIONS:   Current Outpatient Prescriptions:      ALPRAZolam (XANAX) 0.5 MG tablet, TAKE 1/2-1 TABLET BY MOUTH 3 TIMES A DAY AS NEEDED FOR ANXIETY, Disp: 30 tablet, Rfl: 0     busPIRone (BUSPAR) 5 MG tablet, TAKE 1 TABLET (5 MG) BY MOUTH 2 TIMES DAILY, Disp: 180 tablet, Rfl: 1     clindamycin (CLINDAMAX) 1 % gel, APPLY TOPICALLY 2 TIMES DAILY., Disp: , Rfl: 6     diclofenac (VOLTAREN) 75 MG EC tablet, Take 1 tablet (75 mg) by mouth 2 times daily, Disp: 60 tablet, Rfl: 1     traZODone (DESYREL) 50 MG tablet, TAKE 1 TAB BY MOUTH DAILY AT BEDTIME, Disp: 90 tablet, Rfl: 1     venlafaxine (EFFEXOR-XR) 75 MG 24 hr capsule, TAKE 1 CAPSULE (75 MG) BY MOUTH DAILY, Disp: 90 capsule, Rfl: 1     ALLERGIES:    Allergies   Allergen Reactions     Morphine Other (See Comments)      "Risk for impact on the sphincter chloé so told not to take it.     Seasonal Allergies         SOCIAL HISTORY:   Social History     Social History     Marital status:      Spouse name: N/A     Number of children: N/A     Years of education: N/A     Occupational History     Not on file.     Social History Main Topics     Smoking status: Former Smoker     Quit date: 1/1/2000     Smokeless tobacco: Never Used     Alcohol use No     Drug use: No     Sexual activity: Yes     Partners: Male     Other Topics Concern     Not on file     Social History Narrative        FAMILY HISTORY:   Family History   Problem Relation Age of Onset     Diabetes Father      HEART DISEASE Maternal Grandmother      HEART DISEASE Maternal Grandfather         EXAM:Vitals: /80 (BP Location: Right arm, Cuff Size: Adult Regular)  Ht 5' 7.75\" (1.721 m)  Wt 179 lb (81.2 kg)  BMI 27.42 kg/m2  BMI= Body mass index is 27.42 kg/(m^2).    General appearance: Patient is alert and fully cooperative with history & exam.  No sign of distress is noted during the visit.     Psychiatric: Affect is pleasant & appropriate.  Patient appears motivated to improve health.     Respiratory: Breathing is regular & unlabored while sitting.     HEENT: Hearing is intact to spoken word.  Speech is clear.  No gross evidence of visual impairment that would impact ambulation.     Vascular: DP & PT pulses are intact & regular bilaterally.  No significant edema or varicosities noted.  CFT and skin temperature is normal to both lower extremities.     Neurologic: Lower extremity sensation is intact to light touch.  No evidence of weakness or contracture in the lower extremities.  No evidence of neuropathy.    Dermatologic: Skin is intact to both lower extremities with adequate texture, turgor and tone about the integument.  No paronychia or evidence of soft tissue infection is noted.     Musculoskeletal: Patient is ambulatory without assistive device or brace.  No " gross ankle deformity noted.  Most symptoms are noted with firm palpation of the left greater than right second metatarsal phalangeal joint.  There is no palpable edema.  Negative drawer maneuver of the MPJ.  There is limited hallux dorsiflexion noted bilateral.  About 50 degrees total dorsiflexion with subtle tracking.  No crepitus throughout range of motion.  No erythema or heat.    Radiographs: 3 views bilateral demonstrate square or rigid first metatarsal head shape.  Mildly abnormal metatarsal length parabola.  Subtle medial deviation of the lesser digits at the MPJs.     ASSESSMENT:       ICD-10-CM    1. Capsulitis of foot, left M77.52 XR Foot Bilateral G/E 3 Views     diclofenac (VOLTAREN) 75 MG EC tablet   2. Hallux limitus of left foot M20.5X2 XR Foot Bilateral G/E 3 Views     diclofenac (VOLTAREN) 75 MG EC tablet        PLAN:  Reviewed patient's chart in [employer].      10/30/2018   Patient's biomechanical foot structure causes medial deviation of the lesser metatarsal phalangeal joints.  Hallux limitus contributes to this as well.  We discussed that she is only had symptoms for a short period of time therefore education understanding about proper training and appropriate shoe gear.  Begin oral anti-inflammatory.  Discussed injections and custom molded orthotics.  She would like to attempt tablets and changes in activity and OTC inserts first.  If this continues to remain symptomatic I would recommend custom orthotics at that time.  All questions were answered throughout the visit.    In addition to the above history and physical exam, approximately 40 minutes of time was spent with the patient, greater than 50% directly with the patient, counseling, educating, motivational interviewing and coordinating care in regards to the above noted multiple diagnoses in addition to performing the documented procedure.  We have reviewed diagnostic results, discussed prognosis and risks and benefits of treatment options.           Erickson Garibay DPM

## 2018-10-30 NOTE — MR AVS SNAPSHOT
"              After Visit Summary   10/30/2018    Celina Ledesma    MRN: [id number]           Patient Information     Date Of Birth          1970        Visit Information        Provider Department      10/30/2018 11:00 AM Erickson Garibay DPM [facility]        Today's Diagnoses     Hallux valgus, acquired, bilateral    -  1    Hallux limitus of left foot          Care Instructions    DEGENERATIVE ARTHRITIS OF THE BIG TOE JOINT (hallux limitus/hallux rigidus)   Arthritis of the joint at the base of the big toe (metatarsophalangeal joint) has several causes. Usually it results from repetitive trauma to the joint, secondary to abnormal foot mechanics. Often it is hereditary. However, a one-time traumatic event can lead to arthritis. The condition roberson,sn't improve with time, and even with treatment, can worsen. The cartilage wears out, joint surfaces are no longer smooth, bone rubs on bone, inflammation occurs with pain, and eventually bone spurs and loose fragments might develop.   The joint is often painful with activity, worse with flimsy shoes or walking barefoot, and it slowly progresses over time. A person might notice the toe \"locking up\" with walking. There often is an obvious, and irritating, bony bump on top of the foot. Shoes might be uncomfortable. In some people the pain is so bothersome that recreational activities sometimes even normal daily activities are difficult to perform.   The pain from this arthritis is likely a combination of joint jamming, cartilage loss and inflammation, and irritation from shoes rubbing on the bump. Sometimes other parts of the foot, leg, or back hurt from altering one's walk to compensate for the painful jOint.     Ways to help a person live with the discomfort include wearing a good, supportive shoe with a rigid, rocker-type bottom. An example is a hiking boot. A rigid sole minimizes bending of the joint, and therefore, joint motion and " pain. Shoes with a high toe box allow for less rubbing on the bump. Avoiding barefoot walking, sandals, flip-flops and slippers usually helps.   Sometimes an insert or orthotic provides symptom relief. This might make shoe fit more difficult. Pads over the bump and occasionally injections into the joint provide relief.   Surgery for this condition is aimed towards alleviating pain. It does not cure the arthritis nor does it guarantee better joint motion. Depending on the condition of the metatarsophalangeal joint, there are several surgiqal options:    1.  Cutting off the bony bump(s) and cleaning the joint    2.  Loosening the joint up by making cuts in the first metatarsal bone or the big toe bone and removing a small section of bone.    3.  Repositioning bone to minimize jamming of the joint.    4.  In severe cases, the joint is fused. By fusing the joint, it will never bend again. This resolves the pain, because it's the movement of a worn out jOint that causes pain. Oftentimes the operation involves a combination of these procedures and. requires the use of screws, pins, and/or a small surgical plate.     Healing after surgery requires about six weeks of protection. This allows the bone to heal. Maximum recovery takes about one year. The scar tissue and joint structures require this amount of time to finish the healing process. Expect stiffness, swelling and numbness during that time frame.   Surgery for arthritis of the metatarsophalangeal joint does involve side effects. Some side effects are predictable and others are less common but do occur. A scar will be visible and could be irritated by shoes. The shoe may rub on the screw or internal pin requiring surgical removal of these fixation devices. The screw and pin would likely be left in place for a full year. The first toe may remain stiff after surgery. The amount of stiffness is variable. Most people never regain normal motion of the first toe. This is  due to scar tissue inherent to any surgery, in addition to the cumUlative effects of arthritis. Sometimes the big toe drifts to one side or the other. Joint fusion is one option to correct an unstable, drifting toe. This procedure straightens the toe, however, no motion remains.   All surgical procedures involve risk of infection, numbness, pain, delayed bone healing, osteotomy (bone cut) dislocation, blood clots, continued foot pain, etc. Arthritic joint surgery is quite complex and should not be taken lightly.    Any skin incision can lead to infection. Deep infection might involve the bone and thus repeat surgery and six weeks of IV antibiotics. Scar tissue can cause nerve pain or numbness. his is generally temporary but can be permanent. We do not have treatments that cure nerve problems. Second toe pain could be related to altered mechanics and pressure transferred to the second toe. Delayed bone healing would lengthen the healing time. Some bones simply do not heal. This requires repeat surgery, electronic bone stimulation and/or extended protection. Smokers have an approximate 20% chance of poor bone healing. This is double that of a non-smoker. The bone cut may displace. This may need to be repaired with a second operation. Displacement can cause joint malalignment. Immobility after surgery can cause a blood clot in the legs and lungs. This could result in death.   Foot pain is complex. Most feet hurt for more than one reason. Operating on the arthritic   big toe joint will not necessarily create a pain free foot. Appropriate shoes, healthy body weight, avoidance of bare foot walking and moderation of activity will always be   necessary to enjoy foot comfort. Arthritis is incurable even with surgery.     Surgery for this type of arthritis is nevertheless quite successful. Most surgical patients are pleased with their foot following surgery. Many of the issues described above can be controlled by taking  proper care of your foot during the healing process.   Cosmetic bump surgery is discouraged for the reasons listed above. A bump and joint that is comfortable when wearing appropriate shoes should simply be treated with appropriate shoes.   Your surgeon would be happy to fully describe any of the above issues. You should pursue a full understanding of the operation, recovery process and any potential problems that could develop.       Reliable shoe stores: To maximize your experience and provide the best possible fit.  Be sure to show them your foot concerns and tell them Dr. Garibay sent you.      Stores listed in bold have only athletic shoes, and stores that are not bold are mostly casual or variety of shoes    [employer]  [address] [place] [address]  [phone]     [employer] [place]  [address] [place] [address]  [phone]     [employer] [address]  [address] [place] [address]  [phone]    [employer]  [address] [facility]  [facility] [address]  [phone]    [employer]  [address] [place] [address]  [phone]    [employer]  [address] [place] [address]  [phone]                         [employer] Locations:     [address] [place] [address]   [phone]     [address] [place] [address] [address] [place] [address]   [phone]     [address] [place] [address]   [phone]     [address] [facility] [place] [address]   [phone]     [address] [place] [address]   [phone]     [address] [facility] [place] [address]   [phone]     [address]  [facility] [address]   [place] [address]   [phone]     [address]   [place], [place] [id number] [phone]             How to find reasonable shoes          The correct width    Correct Fitting    Correct Length      Foot Distortion    Posture Distortion                          Torsional  "Rigidity      Grasp behind the heel and underneath the foot and twist      Bad    Excessive torsion/twist in midfoot     Less torsion/twist in midfoot is better                   Heel Counter Rigidity      Grasp just above   midsole and squeeze      Bad    Soft heel counter      Good    Rigid Heel Counter      Flexion Rigidity      Grasp shoe and bend from forefoot to rearfoot                        Follow-ups after your visit        Who to contact     If you have questions or need follow up information about today's clinic visit or your schedule please contact [facility] directly at [phone].  Normal or non-critical lab and imaging results will be communicated to you by [employer], letter or phone within 4 business days after the clinic has received the results. If you do not hear from us within 7 days, please contact the clinic through [employer] or phone. If you have a critical or abnormal lab result, we will notify you by phone as soon as possible.  Submit refill requests through [employer] or call your pharmacy and they will forward the refill request to us. Please allow 3 business days for your refill to be completed.          Additional Information About Your Visit        [employer] Information     [employer] gives you secure access to your electronic health record. If you see a primary care provider, you can also send messages to your care team and make appointments. If you have questions, please call your primary care clinic.  If you do not have a primary care provider, please call [phone] and they will assist you.        Care EveryWhere ID     This is your Care EveryWhere ID. This could be used by other organizations to access your [place] medical records  [id number]        Your Vitals Were     Height BMI (Body Mass Index)                5' 7.75\" (1.721 m) 27.42 kg/m2           Blood Pressure from Last 3 Encounters:   10/30/18 112/80   10/22/18 137/84   04/05/18 110/82    Weight from Last 3 " Encounters:   10/30/18 179 lb (81.2 kg)   10/22/18 179 lb (81.2 kg)   04/05/18 170 lb (77.1 kg)                 Today's Medication Changes          These changes are accurate as of 10/30/18 11:53 AM.  If you have any questions, ask your nurse or doctor.               Start taking these medicines.        Dose/Directions    diclofenac 75 MG EC tablet   Commonly known as:  VOLTAREN   Used for:  Hallux limitus of left foot   Started by:  Erickson Garibay DPM        Dose:  75 mg   Take 1 tablet (75 mg) by mouth 2 times daily   Quantity:  60 tablet   Refills:  1            Where to get your medicines      These medications were sent to [facility]/pharmacy #[id number] - [place], [place] - [address]  [address] , [place] [address]     Phone:  [phone]     diclofenac 75 MG EC tablet                Primary Care Provider Office Phone # Fax #    Lucero Gonzalez [phone] [address] [facility] [address]  [facility] [address]        Equal Access to Services     [facility]: Hadii dereje ku hadasho Soomaali, waaxda luqadaha, qaybta kaalmada adeegyada, waxerica hsieh haynatasha earl . So [facility] [phone].    ATENCIÓN: Si habla español, tiene a quiñonez disposición servicios gratuitos de asistencia lingüística. [facility] [phone].    We comply with applicable federal civil rights laws and [place] laws. We do not discriminate on the basis of race, color, national origin, age, disability, sex, sexual orientation, or gender identity.            Thank you!     Thank you for choosing [facility]  for your care. Our goal is always to provide you with excellent care. Hearing back from our patients is one way we can continue to improve our services. Please take a few minutes to complete the written survey that you may receive in the mail after your visit with us. Thank you!             Your Updated Medication List - Protect others around you: Learn how to safely use, store and throw away  your medicines at www.disposemymeds.org.          This list is accurate as of 10/30/18 11:53 AM.  Always use your most recent med list.                   Brand Name Dispense Instructions for use Diagnosis    ALPRAZolam 0.5 MG tablet    XANAX    30 tablet    TAKE 1/2-1 TABLET BY MOUTH 3 TIMES A DAY AS NEEDED FOR ANXIETY    YUDI (generalized anxiety disorder)       busPIRone 5 MG tablet    BUSPAR    180 tablet    TAKE 1 TABLET (5 MG) BY MOUTH 2 TIMES DAILY    YUDI (generalized anxiety disorder)       clindamycin 1 % topical gel    CLINDAMAX     APPLY TOPICALLY 2 TIMES DAILY.        diclofenac 75 MG EC tablet    VOLTAREN    60 tablet    Take 1 tablet (75 mg) by mouth 2 times daily    Hallux limitus of left foot       traZODone 50 MG tablet    DESYREL    90 tablet    TAKE 1 TAB BY MOUTH DAILY AT BEDTIME    YUDI (generalized anxiety disorder), Primary insomnia       venlafaxine 75 MG 24 hr capsule    EFFEXOR-XR    90 capsule    TAKE 1 CAPSULE (75 MG) BY MOUTH DAILY    YUDI (generalized anxiety disorder)

## 2018-10-30 NOTE — PATIENT INSTRUCTIONS
"DEGENERATIVE ARTHRITIS OF THE BIG TOE JOINT (hallux limitus/hallux rigidus)   Arthritis of the joint at the base of the big toe (metatarsophalangeal joint) has several causes. Usually it results from repetitive trauma to the joint, secondary to abnormal foot mechanics. Often it is hereditary. However, a one-time traumatic event can lead to arthritis. The condition roberson,sn't improve with time, and even with treatment, can worsen. The cartilage wears out, joint surfaces are no longer smooth, bone rubs on bone, inflammation occurs with pain, and eventually bone spurs and loose fragments might develop.   The joint is often painful with activity, worse with flimsy shoes or walking barefoot, and it slowly progresses over time. A person might notice the toe \"locking up\" with walking. There often is an obvious, and irritating, bony bump on top of the foot. Shoes might be uncomfortable. In some people the pain is so bothersome that recreational activities sometimes even normal daily activities are difficult to perform.   The pain from this arthritis is likely a combination of joint jamming, cartilage loss and inflammation, and irritation from shoes rubbing on the bump. Sometimes other parts of the foot, leg, or back hurt from altering one's walk to compensate for the painful jOint.     Ways to help a person live with the discomfort include wearing a good, supportive shoe with a rigid, rocker-type bottom. An example is a hiking boot. A rigid sole minimizes bending of the joint, and therefore, joint motion and pain. Shoes with a high toe box allow for less rubbing on the bump. Avoiding barefoot walking, sandals, flip-flops and slippers usually helps.   Sometimes an insert or orthotic provides symptom relief. This might make shoe fit more difficult. Pads over the bump and occasionally injections into the joint provide relief.   Surgery for this condition is aimed towards alleviating pain. It does not cure the arthritis nor does " it guarantee better joint motion. Depending on the condition of the metatarsophalangeal joint, there are several surgiqal options:    1.  Cutting off the bony bump(s) and cleaning the joint    2.  Loosening the joint up by making cuts in the first metatarsal bone or the big toe bone and removing a small section of bone.    3.  Repositioning bone to minimize jamming of the joint.    4.  In severe cases, the joint is fused. By fusing the joint, it will never bend again. This resolves the pain, because it's the movement of a worn out jOint that causes pain. Oftentimes the operation involves a combination of these procedures and. requires the use of screws, pins, and/or a small surgical plate.     Healing after surgery requires about six weeks of protection. This allows the bone to heal. Maximum recovery takes about one year. The scar tissue and joint structures require this amount of time to finish the healing process. Expect stiffness, swelling and numbness during that time frame.   Surgery for arthritis of the metatarsophalangeal joint does involve side effects. Some side effects are predictable and others are less common but do occur. A scar will be visible and could be irritated by shoes. The shoe may rub on the screw or internal pin requiring surgical removal of these fixation devices. The screw and pin would likely be left in place for a full year. The first toe may remain stiff after surgery. The amount of stiffness is variable. Most people never regain normal motion of the first toe. This is due to scar tissue inherent to any surgery, in addition to the cumUlative effects of arthritis. Sometimes the big toe drifts to one side or the other. Joint fusion is one option to correct an unstable, drifting toe. This procedure straightens the toe, however, no motion remains.   All surgical procedures involve risk of infection, numbness, pain, delayed bone healing, osteotomy (bone cut) dislocation, blood clots, continued  foot pain, etc. Arthritic joint surgery is quite complex and should not be taken lightly.    Any skin incision can lead to infection. Deep infection might involve the bone and thus repeat surgery and six weeks of IV antibiotics. Scar tissue can cause nerve pain or numbness. his is generally temporary but can be permanent. We do not have treatments that cure nerve problems. Second toe pain could be related to altered mechanics and pressure transferred to the second toe. Delayed bone healing would lengthen the healing time. Some bones simply do not heal. This requires repeat surgery, electronic bone stimulation and/or extended protection. Smokers have an approximate 20% chance of poor bone healing. This is double that of a non-smoker. The bone cut may displace. This may need to be repaired with a second operation. Displacement can cause joint malalignment. Immobility after surgery can cause a blood clot in the legs and lungs. This could result in death.   Foot pain is complex. Most feet hurt for more than one reason. Operating on the arthritic   big toe joint will not necessarily create a pain free foot. Appropriate shoes, healthy body weight, avoidance of bare foot walking and moderation of activity will always be   necessary to enjoy foot comfort. Arthritis is incurable even with surgery.     Surgery for this type of arthritis is nevertheless quite successful. Most surgical patients are pleased with their foot following surgery. Many of the issues described above can be controlled by taking proper care of your foot during the healing process.   Cosmetic bump surgery is discouraged for the reasons listed above. A bump and joint that is comfortable when wearing appropriate shoes should simply be treated with appropriate shoes.   Your surgeon would be happy to fully describe any of the above issues. You should pursue a full understanding of the operation, recovery process and any potential problems that could develop.        Reliable shoe stores: To maximize your experience and provide the best possible fit.  Be sure to show them your foot concerns and tell them Dr. Garibay sent you.      Stores listed in bold have only athletic shoes, and stores that are not bold are mostly casual or variety of shoes    [employer]  [address] [place] [address]  [phone]    [employer] [place]  [address] [place] [address]  [phone]     [employer]  [address] [place] [address]  [phone]    [employer]  [address] [facility]  [facility] [address]  [phone]    [employer]  [address] [place], [place] [address] [phone]    [employer]  [address] [place] [address]  [phone]                         [employer] Locations:     [address] [place] [address]   [phone]     [address] [place] [address] [address] [place] [address]   [phone]     [address] [place] [address]   [phone]     [address] [facility] [place] [address]   [phone]     [address] [facility] [[email or web address] [place] [address]   [phone]     [address] [place] [address]   [phone]     [address] [facility] [address]   [place] [address]   [phone]     [address] [facility]. [address]   [place] [address]   [phone]             How to find reasonable shoes          The correct width    Correct Fitting    Correct Length      Foot Distortion    Posture Distortion                          Torsional Rigidity      Grasp behind the heel and underneath the foot and twist      Bad    Excessive torsion/twist in midfoot     Less torsion/twist in midfoot is better                   Heel Counter Rigidity      Grasp just above   midsole and squeeze      Bad    Soft heel counter      Good    Rigid Heel Counter      Flexion Rigidity      Grasp shoe and bend from forefoot to rearfoot

## 2018-11-12 DIAGNOSIS — F41.1 GAD (GENERALIZED ANXIETY DISORDER): ICD-10-CM

## 2018-11-12 NOTE — TELEPHONE ENCOUNTER
Requested Prescriptions   Pending Prescriptions Disp Refills     ALPRAZolam (XANAX) 0.5 MG tablet [Pharmacy Med Name: ALPRAZOLAM 0.5 MG TABLET] 30 tablet 0     Sig: TAKE 1/2-1 TABLET BY MOUTH 3 TIMES A DAY AS NEEDED FOR ANXIETY.    There is no refill protocol information for this order          ALPRAZolam (XANAX) 0.5 MG tablet      Last Written Prescription Date:  6/28/18  Last Fill Quantity: 30,   # refills: 0  Last Office Visit: 10/22/18  Future Office visit:       Routing refill request to provider for review/approval because:  Drug not on the [facility], [facility] or [facility] refill protocol or controlled substance

## 2018-11-13 RX ORDER — ALPRAZOLAM 0.5 MG
TABLET ORAL
Qty: 30 TABLET | Refills: 0 | Status: SHIPPED | OUTPATIENT
Start: 2018-11-13 | End: 2019-02-21

## 2018-12-04 DIAGNOSIS — L70.0 ACNE VULGARIS: Primary | ICD-10-CM

## 2018-12-04 NOTE — TELEPHONE ENCOUNTER
"Requested Prescriptions   Pending Prescriptions Disp Refills     clindamycin (CLINDAMAX) 1 % external gel [Pharmacy Med Name: CLINDAMYCIN PH 1% GEL]  Last Written Prescription Date:  08/24/16  Last Fill Quantity: n/a,  # refills: 6   Last office visit: 10/22/2018 with   Dr. Gonzalez  Future Office Visit:    Routing refill request to provider for review/approval because:  Medication is reported/historical   60 g 0     Sig: APPLY TOPICALLY TWO TIMES A DAY.    Topical Acne Medications Protocol Passed    12/4/2018 12:11 PM       Passed - Patient is 12 years of age or older       Passed - Recent (12 mo) or future (30 days) visit within the authorizing provider's specialty    Patient had office visit in the last 12 months or has a visit in the next 30 days with authorizing provider or within the authorizing provider's specialty.  See \"Patient Info\" tab in inbasket, or \"Choose Columns\" in Meds & Orders section of the refill encounter.                "

## 2018-12-07 RX ORDER — CLINDAMYCIN PHOSPHATE 10 MG/G
GEL TOPICAL
Qty: 60 G | Refills: 0 | Status: SHIPPED | OUTPATIENT
Start: 2018-12-07 | End: 2019-05-07

## 2018-12-07 NOTE — TELEPHONE ENCOUNTER
Routing refill request to provider for review/approval because:  Medication is reported/historical.    Allyson Cole RN, [facility]

## 2018-12-19 ENCOUNTER — E-VISIT (OUTPATIENT)
Dept: FAMILY MEDICINE | Facility: CLINIC | Age: 48
End: 2018-12-19
Payer: COMMERCIAL

## 2018-12-19 DIAGNOSIS — F41.1 GAD (GENERALIZED ANXIETY DISORDER): Primary | ICD-10-CM

## 2018-12-19 PROCEDURE — 99444 ZZC PHYSICIAN ONLINE EVALUATION & MANAGEMENT SERVICE: CPT | Performed by: FAMILY MEDICINE

## 2018-12-19 RX ORDER — VENLAFAXINE HYDROCHLORIDE 150 MG/1
150 CAPSULE, EXTENDED RELEASE ORAL DAILY
Qty: 30 CAPSULE | Refills: 1 | Status: SHIPPED | OUTPATIENT
Start: 2018-12-19 | End: 2019-05-07

## 2018-12-19 ASSESSMENT — ANXIETY QUESTIONNAIRES
3. WORRYING TOO MUCH ABOUT DIFFERENT THINGS: SEVERAL DAYS
7. FEELING AFRAID AS IF SOMETHING AWFUL MIGHT HAPPEN: SEVERAL DAYS
GAD7 TOTAL SCORE: 8
1. FEELING NERVOUS, ANXIOUS, OR ON EDGE: MORE THAN HALF THE DAYS
5. BEING SO RESTLESS THAT IT IS HARD TO SIT STILL: SEVERAL DAYS
7. FEELING AFRAID AS IF SOMETHING AWFUL MIGHT HAPPEN: SEVERAL DAYS
6. BECOMING EASILY ANNOYED OR IRRITABLE: NOT AT ALL
GAD7 TOTAL SCORE: 8
4. TROUBLE RELAXING: SEVERAL DAYS
2. NOT BEING ABLE TO STOP OR CONTROL WORRYING: MORE THAN HALF THE DAYS

## 2018-12-20 ASSESSMENT — ANXIETY QUESTIONNAIRES: GAD7 TOTAL SCORE: 8

## 2018-12-20 NOTE — TELEPHONE ENCOUNTER
PHQ-9 SCORE 8/22/2017 2/12/2018 10/22/2018   PHQ-9 Total Score 0 3 2     YUDI-7 SCORE 2/12/2018 10/22/2018 12/19/2018   Total Score - - 8 (mild anxiety)   Total Score 5 6 8

## 2019-02-21 DIAGNOSIS — F41.1 GAD (GENERALIZED ANXIETY DISORDER): ICD-10-CM

## 2019-02-21 RX ORDER — ALPRAZOLAM 0.5 MG
TABLET ORAL
Qty: 30 TABLET | Refills: 0 | Status: SHIPPED | OUTPATIENT
Start: 2019-02-21 | End: 2019-05-07

## 2019-02-21 NOTE — TELEPHONE ENCOUNTER
Requested Prescriptions   Pending Prescriptions Disp Refills     ALPRAZolam (XANAX) 0.5 MG tablet [Pharmacy Med Name: ALPRAZOLAM 0.5 MG TABLET] 30 tablet 0     Sig: TAKE 1/2-1 TABLET BY MOUTH 3 TIMES A DAY AS NEEDED FOR ANXIETY.    There is no refill protocol information for this order          ALPRAZolam (XANAX) 0.5 MG tablet      Last Written Prescription Date:  11/13/18  Last Fill Quantity: 30,   # refills: 0  Last Office Visit: 12/19/18  Future Office visit:       Routing refill request to provider for review/approval because:  Drug not on the [facility], [facility] or [facility] refill protocol or controlled substance

## 2019-05-01 ENCOUNTER — MYC MEDICAL ADVICE (OUTPATIENT)
Dept: FAMILY MEDICINE | Facility: CLINIC | Age: 49
End: 2019-05-01

## 2019-05-06 DIAGNOSIS — F41.1 GAD (GENERALIZED ANXIETY DISORDER): ICD-10-CM

## 2019-05-06 DIAGNOSIS — F51.01 PRIMARY INSOMNIA: ICD-10-CM

## 2019-05-06 RX ORDER — TRAZODONE HYDROCHLORIDE 50 MG/1
TABLET, FILM COATED ORAL
Qty: 30 TABLET | Refills: 0 | Status: SHIPPED | OUTPATIENT
Start: 2019-05-06 | End: 2019-05-31

## 2019-05-06 NOTE — TELEPHONE ENCOUNTER
Reason for Call:  Other prescription    Detailed comments: Please call when approved.    Phone Number Patient can be reached at: Cell number on file:    Telephone Information:   Mobile [phone]     Best Time: any    Can we leave a detailed message on this number? YES    Call taken on 5/6/2019 at 2:30 PM by Rae Marks

## 2019-05-06 NOTE — TELEPHONE ENCOUNTER
"Requested Prescriptions   Pending Prescriptions Disp Refills     traZODone (DESYREL) 50 MG tablet [Pharmacy Med Name: TRAZODONE 50 MG TABLET] 90 tablet 1     Sig: TAKE 1 TABLET BY MOUTH EVERYDAY AT BEDTIME       Serotonin Modulators Passed - 5/6/2019  2:35 PM        Passed - Recent (12 mo) or future (30 days) visit within the authorizing provider's specialty     Patient had office visit in the last 12 months or has a visit in the next 30 days with authorizing provider or within the authorizing provider's specialty.  See \"Patient Info\" tab in inbasket, or \"Choose Columns\" in Meds & Orders section of the refill encounter.              Passed - Medication is active on med list        Passed - Patient is age 18 or older        Passed - No active pregnancy on record        Passed - No positive pregnancy test in past 12 months        traZODone (DESYREL) 50 MG tablet  Last Written Prescription Date:  10/22/18  Last Fill Quantity: 90,  # refills: 1   Last office visit: 10/22/2018 with prescribing provider:  Dr. Gonzalez   Future Office Visit:   Next 5 appointments (look out 90 days)    May 07, 2019 10:40 AM CDT  Office Visit with Lucero Gonzalez MD  [facility] ([facility] [address] [place] [id number] [phone]           "

## 2019-05-06 NOTE — TELEPHONE ENCOUNTER
Medication is being filled for 1 time refill only due to:  Patient needs to be seen because she is due for an annual physical.     Billeo message sent to patient advising on this.     Sandy Virgen RN, BSN

## 2019-05-07 ENCOUNTER — OFFICE VISIT (OUTPATIENT)
Dept: FAMILY MEDICINE | Facility: CLINIC | Age: 49
End: 2019-05-07

## 2019-05-07 VITALS
OXYGEN SATURATION: 99 % | SYSTOLIC BLOOD PRESSURE: 118 MMHG | TEMPERATURE: 97.5 F | DIASTOLIC BLOOD PRESSURE: 76 MMHG | BODY MASS INDEX: 26.36 KG/M2 | WEIGHT: 173.9 LBS | HEIGHT: 68 IN | HEART RATE: 64 BPM

## 2019-05-07 DIAGNOSIS — F41.1 GAD (GENERALIZED ANXIETY DISORDER): Primary | ICD-10-CM

## 2019-05-07 DIAGNOSIS — L70.0 ACNE VULGARIS: ICD-10-CM

## 2019-05-07 DIAGNOSIS — R91.1 INCIDENTAL PULMONARY NODULE, GREATER THAN OR EQUAL TO 8MM: ICD-10-CM

## 2019-05-07 DIAGNOSIS — Z12.31 ENCOUNTER FOR SCREENING MAMMOGRAM FOR BREAST CANCER: ICD-10-CM

## 2019-05-07 PROCEDURE — 99214 OFFICE O/P EST MOD 30 MIN: CPT | Performed by: FAMILY MEDICINE

## 2019-05-07 RX ORDER — ALPRAZOLAM 0.5 MG
TABLET ORAL
Qty: 30 TABLET | Refills: 1 | Status: SHIPPED | OUTPATIENT
Start: 2019-05-07 | End: 2019-12-02

## 2019-05-07 RX ORDER — VENLAFAXINE HYDROCHLORIDE 75 MG/1
CAPSULE, EXTENDED RELEASE ORAL
Qty: 90 CAPSULE | Refills: 1 | Status: SHIPPED | OUTPATIENT
Start: 2019-05-07 | End: 2019-10-21

## 2019-05-07 RX ORDER — CLINDAMYCIN PHOSPHATE 10 MG/G
GEL TOPICAL
Qty: 60 G | Refills: 1 | Status: SHIPPED | OUTPATIENT
Start: 2019-05-07 | End: 2020-06-10

## 2019-05-07 ASSESSMENT — ANXIETY QUESTIONNAIRES
5. BEING SO RESTLESS THAT IT IS HARD TO SIT STILL: SEVERAL DAYS
3. WORRYING TOO MUCH ABOUT DIFFERENT THINGS: SEVERAL DAYS
IF YOU CHECKED OFF ANY PROBLEMS ON THIS QUESTIONNAIRE, HOW DIFFICULT HAVE THESE PROBLEMS MADE IT FOR YOU TO DO YOUR WORK, TAKE CARE OF THINGS AT HOME, OR GET ALONG WITH OTHER PEOPLE: SOMEWHAT DIFFICULT
GAD7 TOTAL SCORE: 6
2. NOT BEING ABLE TO STOP OR CONTROL WORRYING: SEVERAL DAYS
1. FEELING NERVOUS, ANXIOUS, OR ON EDGE: SEVERAL DAYS
6. BECOMING EASILY ANNOYED OR IRRITABLE: NOT AT ALL
7. FEELING AFRAID AS IF SOMETHING AWFUL MIGHT HAPPEN: SEVERAL DAYS

## 2019-05-07 ASSESSMENT — PAIN SCALES - GENERAL: PAINLEVEL: NO PAIN (0)

## 2019-05-07 ASSESSMENT — MIFFLIN-ST. JEOR: SCORE: 1463.34

## 2019-05-07 ASSESSMENT — PATIENT HEALTH QUESTIONNAIRE - PHQ9
5. POOR APPETITE OR OVEREATING: SEVERAL DAYS
SUM OF ALL RESPONSES TO PHQ QUESTIONS 1-9: 4

## 2019-05-07 NOTE — PATIENT INSTRUCTIONS
Continue current medication.  Refills updated.      Follow up chest CT sometime in the next 6 months.    Mammogram recommended   You can call [phone] to schedule this at the [facility] in [place] (formerly the [facility]).    If increase in flow or duration of vaginal bleeding occurs, send message for  referral to GYN.    Refill clindamycin for skin.

## 2019-05-07 NOTE — PROGRESS NOTES
SUBJECTIVE:   Celina Ledesma is a 48 year old female who presents to clinic today for the following   health issues:      Anxiety Follow-Up    Status since last visit: Around jan and feb is was not so good due to the weather, but since then it has been better. Taking 1/2 pill up to 2/wk for anxiety symptoms  Worse in the 2 weeks prior to period.      Other associated symptoms:None    Complicating factors:   Significant life event: No   Current substance abuse: None  Depression symptoms: No  YUDI-7 SCORE 10/22/2018 12/19/2018 5/7/2019   Total Score - 8 (mild anxiety) -   Total Score 6 8 6     PHQ-9 SCORE 2/12/2018 10/22/2018 5/7/2019   PHQ-9 Total Score 3 2 4         YUDI-7    Amount of exercise or physical activity: 6-7 days/week for an average of greater than 60 minutes    Problems taking medications regularly: No    Medication side effects: none    Diet: low salt and low fat/cholesterol        Incidental pulmonary nodule, greater than or equal to 8mm  - evaluation of chest with CT to follow up an abnormal CXR - results showed : 2. Incidental 8 mm groundglass nodule left upper lobe. Recommend  follow-up in 6-12 months to confirm persistence, then CT every 2 years  until 5 years.  No symptoms.  Has been 6 months since last testing in October 2018    Acne vulgaris  - requesting refill of clindamycin, works well for acne symptoms.  No side effects.      Encounter for screening mammogram for breast cancer - due.      Additional history: as documented    Reviewed  and updated as needed this visit by clinical staff  Tobacco  Allergies  Meds  Med Hx  Surg Hx  Fam Hx  Soc Hx        Reviewed and updated as needed this visit by Provider  Tobacco  Allergies  Meds  Med Hx  Surg Hx  Fam Hx  Soc Hx        BP Readings from Last 3 Encounters:   05/07/19 118/76   10/30/18 112/80   10/22/18 137/84    Wt Readings from Last 3 Encounters:   05/07/19 78.9 kg (173 lb 14.4 oz)   10/30/18 81.2 kg (179 lb)   10/22/18  "81.2 kg (179 lb)                    ROS:  Constitutional, HEENT, cardiovascular, pulmonary, gi and gu systems are negative, except as otherwise noted.  History endometrial polyp.  Menses controlled.  U/S with GYN through [facility].      OBJECTIVE:     /76 (BP Location: Right arm, Patient Position: Chair, Cuff Size: Adult Regular)   Pulse 64   Temp 97.5  F (36.4  C) (Tympanic)   Ht 1.721 m (5' 7.75\")   Wt 78.9 kg (173 lb 14.4 oz)   SpO2 99%   Breastfeeding? No   BMI 26.64 kg/m     Body mass index is 26.64 kg/m .  GENERAL: alert, no distress and over weight  NECK: no adenopathy, no asymmetry, masses, or scars and thyroid normal to palpation  RESP: lungs clear to auscultation - no rales, rhonchi or wheezes  CV: regular rate and rhythm, normal S1 S2, no S3 or S4, no murmur, click or rub, no peripheral edema and peripheral pulses strong  MS: no gross musculoskeletal defects noted, no edema  SKIN: no suspicious lesions or rashes  BACK: no CVA tenderness, no paralumbar tenderness  PSYCH: mentation appears normal, affect normal/bright        ASSESSMENT/PLAN:       BMI:   Estimated body mass index is 26.64 kg/m  as calculated from the following:    Height as of this encounter: 1.721 m (5' 7.75\").    Weight as of this encounter: 78.9 kg (173 lb 14.4 oz).   Weight management plan: Discussed healthy diet and exercise guidelines  Wt Readings from Last 5 Encounters:   05/07/19 78.9 kg (173 lb 14.4 oz)   10/30/18 81.2 kg (179 lb)   10/22/18 81.2 kg (179 lb)   04/05/18 77.1 kg (170 lb)   03/06/18 79.4 kg (175 lb)       1. YUDI (generalized anxiety disorder)  Symptoms controlled.  Some seasonal variation.  2-3 months between refills of scripts.    - ALPRAZolam (XANAX) 0.5 MG tablet; TAKE 1/2-1 TABLET BY MOUTH 3 TIMES A DAY AS NEEDED FOR ANXIETY.  Dispense: 30 tablet; Refill: 1  - venlafaxine (EFFEXOR-XR) 75 MG 24 hr capsule; TAKE 1 CAPSULE (75 MG) BY MOUTH DAILY  Dispense: 90 capsule; Refill: 1    2. Incidental pulmonary " nodule, greater than or equal to 8mm  Follow up scan after 6-12 months.  Patient will call to schedule appointment in next few months.    - CT Chest w/o Contrast; Future    3. Acne vulgaris  Refill for prn use.   - clindamycin (CLINDAMAX) 1 % external gel; APPLY TOPICALLY TWO TIMES A DAY.  Dispense: 60 g; Refill: 1    4. Encounter for screening mammogram for breast cancer  due  - MA Screen Bilateral w/Asif; Future    Patient Instructions   Continue current medication.  Refills updated.      Follow up chest CT sometime in the next 6 months.    Mammogram recommended   You can call [phone] to schedule this at the [facility] in [place] (formerly the [facility]).    If increase in flow or duration of vaginal bleeding occurs, send message for  referral to GYN.    Refill clindamycin for skin.        Lucero Gonzalez MD  [facility]

## 2019-05-08 ASSESSMENT — ANXIETY QUESTIONNAIRES: GAD7 TOTAL SCORE: 6

## 2019-05-31 DIAGNOSIS — F51.01 PRIMARY INSOMNIA: ICD-10-CM

## 2019-05-31 DIAGNOSIS — F41.1 GAD (GENERALIZED ANXIETY DISORDER): ICD-10-CM

## 2019-05-31 NOTE — TELEPHONE ENCOUNTER
"Requested Prescriptions   Pending Prescriptions Disp Refills     traZODone (DESYREL) 50 MG tablet [Pharmacy Med Name: TRAZODONE 50 MG TABLET]  Last Written Prescription Date:  5/6/19  Last Fill Quantity: 30 tablet,  # refills: 0   Last office visit: 5/7/2019 with prescribing provider:  Dr. Gonzalez   Future Office Visit:     30 tablet 0     Sig: TAKE 1 TABLET BY MOUTH EVERYDAY AT BEDTIME       Serotonin Modulators Passed - 5/31/2019  9:36 AM        Passed - Recent (12 mo) or future (30 days) visit within the authorizing provider's specialty     Patient had office visit in the last 12 months or has a visit in the next 30 days with authorizing provider or within the authorizing provider's specialty.  See \"Patient Info\" tab in inbasket, or \"Choose Columns\" in Meds & Orders section of the refill encounter.              Passed - Medication is active on med list        Passed - Patient is age 18 or older        Passed - No active pregnancy on record        Passed - No positive pregnancy test in past 12 months          "

## 2019-06-04 RX ORDER — TRAZODONE HYDROCHLORIDE 50 MG/1
TABLET, FILM COATED ORAL
Qty: 30 TABLET | Refills: 3 | Status: SHIPPED | OUTPATIENT
Start: 2019-06-04 | End: 2019-10-21

## 2019-06-04 NOTE — TELEPHONE ENCOUNTER
Prescription approved per [facility] [place] Refill Protocol.      April Mckeon RN, BSN, PHN

## 2019-07-11 ENCOUNTER — E-VISIT (OUTPATIENT)
Dept: FAMILY MEDICINE | Facility: CLINIC | Age: 49
End: 2019-07-11

## 2019-07-11 DIAGNOSIS — R30.0 DYSURIA: ICD-10-CM

## 2019-07-11 DIAGNOSIS — N30.00 ACUTE CYSTITIS WITHOUT HEMATURIA: Primary | ICD-10-CM

## 2019-07-11 PROCEDURE — 99444 ZZC PHYSICIAN ONLINE EVALUATION & MANAGEMENT SERVICE: CPT | Performed by: FAMILY MEDICINE

## 2019-07-11 RX ORDER — SULFAMETHOXAZOLE/TRIMETHOPRIM 800-160 MG
1 TABLET ORAL 2 TIMES DAILY
Qty: 6 TABLET | Refills: 0 | Status: SHIPPED | OUTPATIENT
Start: 2019-07-11 | End: 2019-10-21

## 2019-07-19 DIAGNOSIS — R30.0 DYSURIA: ICD-10-CM

## 2019-07-19 LAB
ALBUMIN UR-MCNC: ABNORMAL MG/DL
APPEARANCE UR: CLEAR
BILIRUB UR QL STRIP: NEGATIVE
COLOR UR AUTO: YELLOW
GLUCOSE UR STRIP-MCNC: NEGATIVE MG/DL
HGB UR QL STRIP: ABNORMAL
KETONES UR STRIP-MCNC: NEGATIVE MG/DL
LEUKOCYTE ESTERASE UR QL STRIP: ABNORMAL
NITRATE UR QL: NEGATIVE
NON-SQ EPI CELLS #/AREA URNS LPF: NORMAL /LPF
PH UR STRIP: 7 PH (ref 5–7)
RBC #/AREA URNS AUTO: NORMAL /HPF
SOURCE: ABNORMAL
SP GR UR STRIP: 1.02 (ref 1–1.03)
UROBILINOGEN UR STRIP-ACNC: 0.2 EU/DL (ref 0.2–1)
WBC #/AREA URNS AUTO: NORMAL /HPF

## 2019-07-19 PROCEDURE — 81001 URINALYSIS AUTO W/SCOPE: CPT | Performed by: FAMILY MEDICINE

## 2019-07-20 NOTE — RESULT ENCOUNTER NOTE
This urine testing does not indicate an infection.  The order was one that I put in from a week ago.  Not sure what if any symptoms you are having now.  Please follow up if ongoing or new symptoms present.      PENELOPE

## 2019-08-01 DIAGNOSIS — F41.1 GAD (GENERALIZED ANXIETY DISORDER): ICD-10-CM

## 2019-08-01 DIAGNOSIS — F51.01 PRIMARY INSOMNIA: ICD-10-CM

## 2019-08-02 NOTE — TELEPHONE ENCOUNTER
"Requested Prescriptions   Pending Prescriptions Disp Refills     traZODone (DESYREL) 50 MG tablet [Pharmacy Med Name: TRAZODONE 50 MG TABLET]  Last Written Prescription Date:  6/04/19  Last Fill Quantity: 30 tablet,  # refills: 3   Last office visit: 5/7/2019 with prescribing provider:  Dr. Gonzalez   Future Office Visit:     90 tablet 1     Sig: TAKE 1 TABLET BY MOUTH EVERYDAY AT BEDTIME       Serotonin Modulators Passed - 8/1/2019  5:35 PM        Passed - Recent (12 mo) or future (30 days) visit within the authorizing provider's specialty     Patient had office visit in the last 12 months or has a visit in the next 30 days with authorizing provider or within the authorizing provider's specialty.  See \"Patient Info\" tab in inbasket, or \"Choose Columns\" in Meds & Orders section of the refill encounter.              Passed - Medication is active on med list        Passed - Patient is age 18 or older        Passed - No active pregnancy on record        Passed - No positive pregnancy test in past 12 months          "

## 2019-08-06 RX ORDER — TRAZODONE HYDROCHLORIDE 50 MG/1
TABLET, FILM COATED ORAL
Qty: 90 TABLET | Refills: 1 | OUTPATIENT
Start: 2019-08-06

## 2019-08-08 DIAGNOSIS — F41.1 GAD (GENERALIZED ANXIETY DISORDER): ICD-10-CM

## 2019-08-08 NOTE — TELEPHONE ENCOUNTER
"Requested Prescriptions   Pending Prescriptions Disp Refills     venlafaxine (EFFEXOR-XR) 75 MG 24 hr capsule [Pharmacy Med Name: VENLAFAXINE HCL ER 75 MG CAP]  Last Written Prescription Date:  5/7/19  Last Fill Quantity: 90 capsule,  # refills: 1   Last office visit: 5/7/2019 with prescribing provider:  Dr. Gonzalez   Future Office Visit:     90 capsule 1     Sig: TAKE 1 CAPSULE (75 MG) BY MOUTH DAILY       Serotonin-Norepinephrine Reuptake Inhibitors  Failed - 8/8/2019  1:23 AM        Failed - Normal serum creatinine on file in past 12 months     Recent Labs   Lab Test 04/05/18  1328   CR 0.92             Passed - Blood pressure under 140/90 in past 12 months     BP Readings from Last 3 Encounters:   05/07/19 118/76   10/30/18 112/80   10/22/18 137/84                 Passed - Recent (12 mo) or future (30 days) visit within the authorizing provider's specialty     Patient had office visit in the last 12 months or has a visit in the next 30 days with authorizing provider or within the authorizing provider's specialty.  See \"Patient Info\" tab in inbasket, or \"Choose Columns\" in Meds & Orders section of the refill encounter.              Passed - Medication is active on med list        Passed - Patient is age 18 or older        Passed - No active pregnancy on record        Passed - No positive pregnancy test in past 12 months          "

## 2019-08-09 RX ORDER — VENLAFAXINE HYDROCHLORIDE 75 MG/1
CAPSULE, EXTENDED RELEASE ORAL
Qty: 90 CAPSULE | Refills: 1 | OUTPATIENT
Start: 2019-08-09

## 2019-08-09 NOTE — TELEPHONE ENCOUNTER
Refused noting to see the 5/7/19 Rx for 90 plus one.    Allyson Cole RN, [facility] Triage

## 2019-09-04 ENCOUNTER — ANCILLARY PROCEDURE (OUTPATIENT)
Dept: CT IMAGING | Facility: CLINIC | Age: 49
End: 2019-09-04
Attending: FAMILY MEDICINE

## 2019-09-04 ENCOUNTER — ANCILLARY PROCEDURE (OUTPATIENT)
Dept: MAMMOGRAPHY | Facility: CLINIC | Age: 49
End: 2019-09-04
Attending: FAMILY MEDICINE

## 2019-09-04 DIAGNOSIS — Z12.31 ENCOUNTER FOR SCREENING MAMMOGRAM FOR BREAST CANCER: ICD-10-CM

## 2019-09-04 DIAGNOSIS — R91.1 INCIDENTAL PULMONARY NODULE, GREATER THAN OR EQUAL TO 8MM: ICD-10-CM

## 2019-09-04 LAB — RADIOLOGIST FLAGS: NORMAL

## 2019-09-04 PROCEDURE — 71250 CT THORAX DX C-: CPT | Performed by: RADIOLOGY

## 2019-09-04 PROCEDURE — 77067 SCR MAMMO BI INCL CAD: CPT

## 2019-09-04 PROCEDURE — 77063 BREAST TOMOSYNTHESIS BI: CPT

## 2019-10-03 ENCOUNTER — HEALTH MAINTENANCE LETTER (OUTPATIENT)
Age: 49
End: 2019-10-03

## 2019-10-21 ENCOUNTER — OFFICE VISIT (OUTPATIENT)
Dept: FAMILY MEDICINE | Facility: CLINIC | Age: 49
End: 2019-10-21

## 2019-10-21 VITALS
SYSTOLIC BLOOD PRESSURE: 132 MMHG | HEIGHT: 68 IN | TEMPERATURE: 98 F | DIASTOLIC BLOOD PRESSURE: 86 MMHG | WEIGHT: 162 LBS | HEART RATE: 50 BPM | RESPIRATION RATE: 16 BRPM | OXYGEN SATURATION: 99 % | BODY MASS INDEX: 24.55 KG/M2

## 2019-10-21 DIAGNOSIS — F41.1 GAD (GENERALIZED ANXIETY DISORDER): Primary | ICD-10-CM

## 2019-10-21 DIAGNOSIS — H10.13 ALLERGIC CONJUNCTIVITIS, BILATERAL: ICD-10-CM

## 2019-10-21 DIAGNOSIS — F51.01 PRIMARY INSOMNIA: ICD-10-CM

## 2019-10-21 PROCEDURE — 99213 OFFICE O/P EST LOW 20 MIN: CPT | Performed by: FAMILY MEDICINE

## 2019-10-21 RX ORDER — TRAZODONE HYDROCHLORIDE 50 MG/1
50 TABLET, FILM COATED ORAL AT BEDTIME
Qty: 90 TABLET | Refills: 1 | Status: SHIPPED | OUTPATIENT
Start: 2019-10-21 | End: 2020-02-24

## 2019-10-21 RX ORDER — VENLAFAXINE HYDROCHLORIDE 75 MG/1
75 CAPSULE, EXTENDED RELEASE ORAL DAILY
Qty: 90 CAPSULE | Refills: 1 | Status: SHIPPED | OUTPATIENT
Start: 2019-10-21 | End: 2020-02-24

## 2019-10-21 RX ORDER — ALPRAZOLAM 0.5 MG
TABLET ORAL
Qty: 30 TABLET | Refills: 1 | Status: CANCELLED | OUTPATIENT
Start: 2019-10-21

## 2019-10-21 ASSESSMENT — MIFFLIN-ST. JEOR: SCORE: 1404.36

## 2019-10-21 ASSESSMENT — ANXIETY QUESTIONNAIRES
2. NOT BEING ABLE TO STOP OR CONTROL WORRYING: SEVERAL DAYS
3. WORRYING TOO MUCH ABOUT DIFFERENT THINGS: SEVERAL DAYS
7. FEELING AFRAID AS IF SOMETHING AWFUL MIGHT HAPPEN: SEVERAL DAYS
5. BEING SO RESTLESS THAT IT IS HARD TO SIT STILL: SEVERAL DAYS
IF YOU CHECKED OFF ANY PROBLEMS ON THIS QUESTIONNAIRE, HOW DIFFICULT HAVE THESE PROBLEMS MADE IT FOR YOU TO DO YOUR WORK, TAKE CARE OF THINGS AT HOME, OR GET ALONG WITH OTHER PEOPLE: SOMEWHAT DIFFICULT
1. FEELING NERVOUS, ANXIOUS, OR ON EDGE: SEVERAL DAYS
GAD7 TOTAL SCORE: 7
6. BECOMING EASILY ANNOYED OR IRRITABLE: SEVERAL DAYS

## 2019-10-21 ASSESSMENT — PATIENT HEALTH QUESTIONNAIRE - PHQ9
SUM OF ALL RESPONSES TO PHQ QUESTIONS 1-9: 4
5. POOR APPETITE OR OVEREATING: SEVERAL DAYS

## 2019-10-21 NOTE — PROGRESS NOTES
Subjective     Celina Ledesma is a 49 year old female who presents to clinic today for the following health issues:    HPI   Anxiety Follow-Up    How are you doing with your anxiety since your last visit? No change    Are you having other symptoms that might be associated with anxiety? Yes:  feeling anxious     Have you had a significant life event? OTHER: Vertigo  With travel and menses in , recurred with menses in October.  Monthly menses between episodes without vertigo symptoms.    Are you feeling depressed? No    Do you have any concerns with your use of alcohol or other drugs? No    Social History     Tobacco Use     Smoking status: Former Smoker     Last attempt to quit: 2000     Years since quittin.8     Smokeless tobacco: Never Used   Substance Use Topics     Alcohol use: No     Drug use: No     YUDI-7 SCORE 2018 2019 10/21/2019   Total Score 8 (mild anxiety) - -   Total Score 8 6 7     PHQ 10/22/2018 2019 10/21/2019   PHQ-9 Total Score 2 4 4   Q9: Thoughts of better off dead/self-harm past 2 weeks Not at all Not at all Not at all       Insomnia  Some difficulty sleeping remains.  Taking trazodone with good control of symptoms.  No side effects reported.      Dry eye symptoms   Was recommended by a family member to use eye drops- Patanol.  Wondering about OTC medications.  Symptoms better now than over the weekend when she was up [place].      How many servings of fruits and vegetables do you eat daily?  2-3    On average, how many sweetened beverages do you drink each day (soda, juice, sweet tea, etc)?   1    How many days per week do you miss taking your medication? 0            BP Readings from Last 3 Encounters:   10/21/19 132/86   19 118/76   10/30/18 112/80    Wt Readings from Last 3 Encounters:   10/21/19 73.5 kg (162 lb)   19 78.9 kg (173 lb 14.4 oz)   10/30/18 81.2 kg (179 lb)                      Reviewed and updated as needed this visit by  "Provider  Tobacco  Allergies  Meds  Med Hx  Surg Hx  Fam Hx  Soc Hx        Review of Systems   ROS COMP: Constitutional, HEENT, cardiovascular, pulmonary, gi and gu systems are negative, except as otherwise noted.      Objective    BP (!) 128/90 (BP Location: Right arm, Patient Position: Sitting, Cuff Size: Adult Regular)   Pulse 50   Temp 98  F (36.7  C) (Oral)   Resp 16   Ht 1.721 m (5' 7.75\")   Wt 73.5 kg (162 lb)   SpO2 99%   BMI 24.81 kg/m    Body mass index is 24.81 kg/m .  Physical Exam   GENERAL: healthy, alert and no distress  EYES: Eyes grossly normal to inspection, PERRL and conjunctivae and sclerae normal  HENT: ear canals and TM's normal, nose and mouth without ulcers or lesions  NECK: no adenopathy, no asymmetry, masses, or scars and thyroid normal to palpation  RESP: lungs clear to auscultation - no rales, rhonchi or wheezes  CV: regular rate and rhythm, normal S1 S2, no S3 or S4, no murmur, click or rub, no peripheral edema and peripheral pulses strong  MS: no gross musculoskeletal defects noted, no edema  BACK: no CVA tenderness, no paralumbar tenderness  PSYCH: mentation appears normal, affect normal/bright, judgement and insight intact and appearance well groomed.  No overt anxiety on exam currently.    Diagnostic Test Results:  Labs reviewed in Epic        Assessment & Plan     1. YUDI (generalized anxiety disorder)  Refill medications.  Continue current treatment.  Has xanax for prn use. Request refill from pharmacy when needed.  - traZODone (DESYREL) 50 MG tablet; Take 1 tablet (50 mg) by mouth At Bedtime  Dispense: 90 tablet; Refill: 1  - venlafaxine (EFFEXOR-XR) 75 MG 24 hr capsule; Take 1 capsule (75 mg) by mouth daily  Dispense: 90 capsule; Refill: 1    2. Primary insomnia  Continue current use.  - traZODone (DESYREL) 50 MG tablet; Take 1 tablet (50 mg) by mouth At Bedtime  Dispense: 90 tablet; Refill: 1    3. Allergic conjunctivitis, bilateral  OTC treatment recommended.  Call " if script desired.         Patient Instructions     Continue Effexor and trazodone as previous.    Continue as needed xanax - call for refill when needed.    Use over the counter allergy eye drops.  If not effective - send a message for patanol script.                  Return in about 6 months (around 4/21/2020) for recheck mood, Physical Exam.    Lucero Gonzalez MD  [facility]

## 2019-10-21 NOTE — PATIENT INSTRUCTIONS
Continue Effexor and trazodone as previous.    Continue as needed xanax - call for refill when needed.    Use over the counter allergy eye drops.  If not effective - send a message for patanol script.          At [facility], we strive to deliver an exceptional experience to you, every time we see you.  If you receive a survey in the mail, please send us back your thoughts. We really do value your feedback.    Your care team:     Family Medicine   AUGUSTA Weaver MD Emily Bunt, APRN CNP S. MD Lucero Do MD Angela Wermerskirchen, MD         Clinic hours: Monday - Wednesday 7 am-7 pm   Thursdays and Fridays 7 am-5 pm.     [facility]: Monday - Friday 11 am-9 pm,   Saturday and Sunday 9 am-5 pm.    [facility]: Monday -Thursday 8 am-8 pm; Friday 8 am-6 pm; Saturday and Sunday 9 am-5 pm.     [facility]: Monday - Thursday 8 am - 7 pm; Friday 8 am - 6 pm    Clinic: [phone]   [facility]: [phone] [facility]: [phone]

## 2019-10-22 DIAGNOSIS — F41.1 GAD (GENERALIZED ANXIETY DISORDER): ICD-10-CM

## 2019-10-22 DIAGNOSIS — F51.01 PRIMARY INSOMNIA: ICD-10-CM

## 2019-10-22 ASSESSMENT — ANXIETY QUESTIONNAIRES: GAD7 TOTAL SCORE: 7

## 2019-10-24 RX ORDER — TRAZODONE HYDROCHLORIDE 50 MG/1
TABLET, FILM COATED ORAL
Qty: 90 TABLET | Refills: 1 | OUTPATIENT
Start: 2019-10-24

## 2019-12-02 DIAGNOSIS — F41.1 GAD (GENERALIZED ANXIETY DISORDER): ICD-10-CM

## 2019-12-03 RX ORDER — ALPRAZOLAM 0.5 MG
TABLET ORAL
Qty: 30 TABLET | Refills: 0 | Status: SHIPPED | OUTPATIENT
Start: 2019-12-03 | End: 2020-02-24

## 2019-12-03 NOTE — TELEPHONE ENCOUNTER
xanax rx has been faxed to [facility]/pharmacy #[id number] - [place], [place] Jenae Wittmann R.T. (R)(M)

## 2019-12-17 ENCOUNTER — OFFICE VISIT (OUTPATIENT)
Dept: FAMILY MEDICINE | Facility: CLINIC | Age: 49
End: 2019-12-17

## 2019-12-17 VITALS
OXYGEN SATURATION: 99 % | SYSTOLIC BLOOD PRESSURE: 129 MMHG | TEMPERATURE: 98.7 F | BODY MASS INDEX: 24.55 KG/M2 | RESPIRATION RATE: 18 BRPM | WEIGHT: 162 LBS | HEART RATE: 62 BPM | DIASTOLIC BLOOD PRESSURE: 58 MMHG | HEIGHT: 68 IN

## 2019-12-17 DIAGNOSIS — R10.13 ABDOMINAL PAIN, EPIGASTRIC: Primary | ICD-10-CM

## 2019-12-17 DIAGNOSIS — R68.83 CHILLS: ICD-10-CM

## 2019-12-17 DIAGNOSIS — R52 BODY ACHES: ICD-10-CM

## 2019-12-17 DIAGNOSIS — Z87.440 PERSONAL HISTORY OF URINARY TRACT INFECTION: ICD-10-CM

## 2019-12-17 DIAGNOSIS — R31.21 ASYMPTOMATIC MICROSCOPIC HEMATURIA: ICD-10-CM

## 2019-12-17 LAB
ALBUMIN UR-MCNC: NEGATIVE MG/DL
APPEARANCE UR: CLEAR
BACTERIA #/AREA URNS HPF: ABNORMAL /HPF
BILIRUB UR QL STRIP: NEGATIVE
COLOR UR AUTO: YELLOW
ERYTHROCYTE [DISTWIDTH] IN BLOOD BY AUTOMATED COUNT: 12.1 % (ref 10–15)
GLUCOSE UR STRIP-MCNC: NEGATIVE MG/DL
HCT VFR BLD AUTO: 40.3 % (ref 35–47)
HGB BLD-MCNC: 13.1 G/DL (ref 11.7–15.7)
HGB UR QL STRIP: ABNORMAL
HYALINE CASTS #/AREA URNS LPF: ABNORMAL /LPF
KETONES UR STRIP-MCNC: NEGATIVE MG/DL
LEUKOCYTE ESTERASE UR QL STRIP: NEGATIVE
MCH RBC QN AUTO: 31 PG (ref 26.5–33)
MCHC RBC AUTO-ENTMCNC: 32.5 G/DL (ref 31.5–36.5)
MCV RBC AUTO: 95 FL (ref 78–100)
NITRATE UR QL: NEGATIVE
NON-SQ EPI CELLS #/AREA URNS LPF: ABNORMAL /LPF
PH UR STRIP: 5.5 PH (ref 5–7)
PLATELET # BLD AUTO: 281 10E9/L (ref 150–450)
RBC # BLD AUTO: 4.23 10E12/L (ref 3.8–5.2)
RBC #/AREA URNS AUTO: ABNORMAL /HPF
SOURCE: ABNORMAL
SP GR UR STRIP: 1.02 (ref 1–1.03)
UROBILINOGEN UR STRIP-ACNC: 0.2 EU/DL (ref 0.2–1)
WBC # BLD AUTO: 7.6 10E9/L (ref 4–11)
WBC #/AREA URNS AUTO: ABNORMAL /HPF

## 2019-12-17 PROCEDURE — 36415 COLL VENOUS BLD VENIPUNCTURE: CPT | Performed by: NURSE PRACTITIONER

## 2019-12-17 PROCEDURE — 80053 COMPREHEN METABOLIC PANEL: CPT | Performed by: NURSE PRACTITIONER

## 2019-12-17 PROCEDURE — 87186 SC STD MICRODIL/AGAR DIL: CPT | Performed by: NURSE PRACTITIONER

## 2019-12-17 PROCEDURE — 83690 ASSAY OF LIPASE: CPT | Performed by: NURSE PRACTITIONER

## 2019-12-17 PROCEDURE — 87088 URINE BACTERIA CULTURE: CPT | Performed by: NURSE PRACTITIONER

## 2019-12-17 PROCEDURE — 85027 COMPLETE CBC AUTOMATED: CPT | Performed by: NURSE PRACTITIONER

## 2019-12-17 PROCEDURE — 82150 ASSAY OF AMYLASE: CPT | Performed by: NURSE PRACTITIONER

## 2019-12-17 PROCEDURE — 87086 URINE CULTURE/COLONY COUNT: CPT | Performed by: NURSE PRACTITIONER

## 2019-12-17 PROCEDURE — 81001 URINALYSIS AUTO W/SCOPE: CPT | Performed by: NURSE PRACTITIONER

## 2019-12-17 PROCEDURE — 99214 OFFICE O/P EST MOD 30 MIN: CPT | Performed by: NURSE PRACTITIONER

## 2019-12-17 ASSESSMENT — MIFFLIN-ST. JEOR: SCORE: 1404.36

## 2019-12-17 ASSESSMENT — PAIN SCALES - GENERAL: PAINLEVEL: MILD PAIN (3)

## 2019-12-18 LAB
ALBUMIN SERPL-MCNC: 4 G/DL (ref 3.4–5)
ALP SERPL-CCNC: 80 U/L (ref 40–150)
ALT SERPL W P-5'-P-CCNC: 18 U/L (ref 0–50)
AMYLASE SERPL-CCNC: 47 U/L (ref 30–110)
ANION GAP SERPL CALCULATED.3IONS-SCNC: 5 MMOL/L (ref 3–14)
AST SERPL W P-5'-P-CCNC: 20 U/L (ref 0–45)
BILIRUB SERPL-MCNC: 0.4 MG/DL (ref 0.2–1.3)
BUN SERPL-MCNC: 18 MG/DL (ref 7–30)
CALCIUM SERPL-MCNC: 9.2 MG/DL (ref 8.5–10.1)
CHLORIDE SERPL-SCNC: 107 MMOL/L (ref 94–109)
CO2 SERPL-SCNC: 28 MMOL/L (ref 20–32)
CREAT SERPL-MCNC: 0.71 MG/DL (ref 0.52–1.04)
GFR SERPL CREATININE-BSD FRML MDRD: >90 ML/MIN/{1.73_M2}
GLUCOSE SERPL-MCNC: 95 MG/DL (ref 70–99)
LIPASE SERPL-CCNC: 187 U/L (ref 73–393)
POTASSIUM SERPL-SCNC: 4 MMOL/L (ref 3.4–5.3)
PROT SERPL-MCNC: 6.6 G/DL (ref 6.8–8.8)
SODIUM SERPL-SCNC: 140 MMOL/L (ref 133–144)

## 2019-12-18 NOTE — PROGRESS NOTES
Subjective     Celina Ledesma is a 49 year old female who presents to clinic today for the following health issues:    HPI   Follow up Macrobid Reaction- On Demand for UTI. macrobid she developed cough, chills, abdominal pain. Then switched to sulfa/bactrim for 2 days. Also has pain under rib cage, low back pain. She googled her symptoms. Urine symptoms all resolved. Admits hasn't been drinking enough.     Symptoms have been on-going for the past 2 weeks or so.   -Cough  -chills  -Body aches- left side under rib and it is still present(has hx of pancreatitis-not for years and macrobid can cause this as well  -Stomach pain-all t he time, after she eats maybe slight increase, advil helps, take with food. No bloodin stool  -SOB  Tried 2 omeprazole and didn't help much. She is mostly worried about pancreatitis as she has had that fairly bad in the past. Denies further UTI symptoms    Did not get flu shot.     Patient Active Problem List   Diagnosis     YUDI (generalized anxiety disorder)     Endometrial polyp     Lipoma of skin and subcutaneous tissue     History reviewed. No pertinent surgical history.    Social History     Tobacco Use     Smoking status: Former Smoker     Last attempt to quit: 2000     Years since quittin.9     Smokeless tobacco: Never Used   Substance Use Topics     Alcohol use: No     Family History   Problem Relation Age of Onset     Diabetes Father      Heart Disease Maternal Grandmother      Heart Disease Maternal Grandfather          Current Outpatient Medications   Medication Sig Dispense Refill     ALPRAZolam (XANAX) 0.5 MG tablet TAKE 1/2-1 TABLET BY MOUTH 3 TIMES A DAY AS NEEDED FOR ANXIETY 30 tablet 0     clindamycin (CLINDAMAX) 1 % external gel APPLY TOPICALLY TWO TIMES A DAY. 60 g 1     traZODone (DESYREL) 50 MG tablet Take 1 tablet (50 mg) by mouth At Bedtime 90 tablet 1     venlafaxine (EFFEXOR-XR) 75 MG 24 hr capsule Take 1 capsule (75 mg) by mouth daily 90 capsule 1  "    Allergies   Allergen Reactions     Macrobid [Nitrofurantoin]      Cough chills, abdominal pain     No Clinical Screening - See Comments Itching     Morphine Other (See Comments)     Risk for impact on the sphincter chloé so told not to take it.  problem with the sphincter of ode  itchy face, PN: effects chronic pancreatitis       Seasonal Allergies        Reviewed and updated as needed this visit by Provider  Tobacco  Allergies  Meds  Problems  Med Hx  Surg Hx  Fam Hx         Review of Systems   ROS COMP: Constitutional, HEENT-as above, cardiovascular, pulmonary, gi and gu-as above systems are negative, except as otherwise noted.      Objective    /58 (BP Location: Right arm, Patient Position: Sitting, Cuff Size: Adult Regular)   Pulse 62   Temp 98.7  F (37.1  C) (Oral)   Resp 18   Ht 1.721 m (5' 7.75\")   Wt 73.5 kg (162 lb)   LMP 12/15/2019   SpO2 99%   BMI 24.81 kg/m    Body mass index is 24.81 kg/m .  Physical Exam   GENERAL: healthy, alert and no acute distress  EYES: Eyes grossly normal to inspection, PERRL and conjunctivae and sclerae normal  HENT: ear canals and TM's normal, nose and mouth without ulcers or lesions, posterior pharynx no erythema or exudate  NECK: no adenopathy, no asymmetry, masses, or scars and thyroid normal to palpation  RESP: lungs clear to auscultation - no rales, rhonchi or wheezes  CV: regular rate and rhythm, normal S1 S2, no S3 or S4, no murmur, click or rub  ABDOMEN: soft, mild discomfort in epigastric region, no hepatosplenomegaly, no masses and bowel sounds normal  MS: no gross musculoskeletal defects noted, no edema    Diagnostic Test Results:  Labs reviewed in Epic  Results for orders placed or performed in visit on 12/17/19 (from the past 24 hour(s))   CBC with platelets   Result Value Ref Range    WBC 7.6 4.0 - 11.0 10e9/L    RBC Count 4.23 3.8 - 5.2 10e12/L    Hemoglobin 13.1 11.7 - 15.7 g/dL    Hematocrit 40.3 35.0 - 47.0 %    MCV 95 78 - 100 fl    MCH " 31.0 26.5 - 33.0 pg    MCHC 32.5 31.5 - 36.5 g/dL    RDW 12.1 10.0 - 15.0 %    Platelet Count 281 150 - 450 10e9/L   *UA reflex to Microscopic and Culture ([place] and [facility] (except [place] and [place])   Result Value Ref Range    Color Urine Yellow     Appearance Urine Clear     Glucose Urine Negative NEG^Negative mg/dL    Bilirubin Urine Negative NEG^Negative    Ketones Urine Negative NEG^Negative mg/dL    Specific Gravity Urine 1.025 1.003 - 1.035    Blood Urine Moderate (A) NEG^Negative    pH Urine 5.5 5.0 - 7.0 pH    Protein Albumin Urine Negative NEG^Negative mg/dL    Urobilinogen Urine 0.2 0.2 - 1.0 EU/dL    Nitrite Urine Negative NEG^Negative    Leukocyte Esterase Urine Negative NEG^Negative    Source Midstream Urine    Urine Microscopic   Result Value Ref Range    WBC Urine 0 - 5 OTO5^0 - 5 /HPF    RBC Urine O - 2 OTO2^O - 2 /HPF    Hyaline Casts O - 2 OTO2^O - 2 /LPF    Squamous Epithelial /LPF Urine Few FEW^Few /LPF    Bacteria Urine Few (A) NEG^Negative /HPF           Assessment & Plan     1. Abdominal pain, epigastric  Unknown cause. Recent UTI, denies further dysuria. She has hx of pancreatitis so will rule that out, monitor for now  - Lipase  - Amylase  - CBC with platelets  - Comprehensive metabolic panel (BMP + Alb, Alk Phos, ALT, AST, Total. Bili, TP)  - US Abdomen Complete; Future  - Urine Microscopic    2. Chills  As above    3. Body aches  As above  - Lipase  - Amylase  - Comprehensive metabolic panel (BMP + Alb, Alk Phos, ALT, AST, Total. Bili, TP)    4. Personal history of urinary tract infection  Culture pending  - *UA reflex to Microscopic and Culture ([place] and [facility] (except [place] and [place])    5. Asymptomatic microscopic hematuria  As above  - Urine Culture Aerobic Bacterial       Return in about 1 week (around 12/24/2019), or if symptoms worsen or fail to improve.  Or pending lab/test results    SYL Howell, NP-C  [facility]

## 2019-12-19 ENCOUNTER — MYC MEDICAL ADVICE (OUTPATIENT)
Dept: FAMILY MEDICINE | Facility: CLINIC | Age: 49
End: 2019-12-19

## 2019-12-21 LAB
BACTERIA SPEC CULT: ABNORMAL
SPECIMEN SOURCE: ABNORMAL

## 2019-12-22 DIAGNOSIS — B37.31 YEAST INFECTION OF THE VAGINA: ICD-10-CM

## 2019-12-22 DIAGNOSIS — N30.00 ACUTE CYSTITIS WITHOUT HEMATURIA: Primary | ICD-10-CM

## 2019-12-22 RX ORDER — CIPROFLOXACIN 500 MG/1
500 TABLET, FILM COATED ORAL 2 TIMES DAILY
Qty: 10 TABLET | Refills: 0 | Status: SHIPPED | OUTPATIENT
Start: 2019-12-22 | End: 2020-02-24

## 2019-12-22 RX ORDER — FLUCONAZOLE 150 MG/1
150 TABLET ORAL ONCE
Qty: 1 TABLET | Refills: 0 | Status: SHIPPED | OUTPATIENT
Start: 2019-12-22 | End: 2020-02-24

## 2019-12-24 ENCOUNTER — TELEPHONE (OUTPATIENT)
Dept: FAMILY MEDICINE | Facility: CLINIC | Age: 49
End: 2019-12-24

## 2019-12-24 NOTE — TELEPHONE ENCOUNTER
Please call patient re:  Infection. The culture shows resistance to the sulfa medication she has taken previously.    She can use Levaquin, Cipro or Nitrofurantoin.    The nitrofurantoin she has an allergy listed for.  The cipro is what she is on now.  The Levaquin would be the only other option that is not an IV.      PSK

## 2019-12-24 NOTE — TELEPHONE ENCOUNTER
Reason for call:  Symptom   Symptom or request: uti    Duration (how long have symptoms been present): 2.5 weeks  Have you been treated for this before? Yes    Additional comments: Patient has been put on multiple medications, the most recent being Cipro. She took one and is feeling achy and agitated. She would like to know if she could try the other sulfa drug she had tried. She is not planning on continuing Cipro. Please advise    Phone number to reach patient:  Home number on file [phone] (home)    Best Time:  As soon as possible    Can we leave a detailed message on this number?  YES

## 2019-12-26 ENCOUNTER — OFFICE VISIT (OUTPATIENT)
Dept: FAMILY MEDICINE | Facility: CLINIC | Age: 49
End: 2019-12-26

## 2019-12-26 VITALS
BODY MASS INDEX: 24.71 KG/M2 | RESPIRATION RATE: 18 BRPM | TEMPERATURE: 98.4 F | HEART RATE: 60 BPM | HEIGHT: 68 IN | WEIGHT: 163 LBS | SYSTOLIC BLOOD PRESSURE: 119 MMHG | DIASTOLIC BLOOD PRESSURE: 70 MMHG

## 2019-12-26 DIAGNOSIS — M25.50 MULTIPLE JOINT PAIN: Primary | ICD-10-CM

## 2019-12-26 DIAGNOSIS — F41.9 ANXIETY: ICD-10-CM

## 2019-12-26 PROCEDURE — 99213 OFFICE O/P EST LOW 20 MIN: CPT | Performed by: FAMILY MEDICINE

## 2019-12-26 ASSESSMENT — MIFFLIN-ST. JEOR: SCORE: 1408.89

## 2019-12-26 ASSESSMENT — PAIN SCALES - GENERAL: PAINLEVEL: SEVERE PAIN (6)

## 2019-12-26 NOTE — TELEPHONE ENCOUNTER
This writer attempted to contact Celina on 12/26/19      Reason for call provider informatiion and left message.      If patient calls back:   Registered Nurse called. Follow Triage Call workflow      Allyson Cole, GEOFFREY

## 2019-12-26 NOTE — TELEPHONE ENCOUNTER
Called the patient to go over information. She is having issues with her tendons and achilles now from cipro. Scheduled in clinic for assessment at  to discuss all the information.    Allyson Cole RN, [facility] Triage

## 2019-12-26 NOTE — TELEPHONE ENCOUNTER
Patient  returned call    Best number to reach caller: Other phone number:  [phone] (H)    Is it ok to leave a detailed message: yes

## 2019-12-26 NOTE — PROGRESS NOTES
"Subjective     Celina Ledesma is a 49 year old female who presents to clinic today for the following health issues:    HPI   Concern - Medication Problem  Onset: 12/25/19 morning    Description:   Patient complains of problem with tendons and achilles from Cipro medication for treatment of urinary problem. Patient has started medication for 2.5 days, last taken yesterday.    Intensity: moderate    Progression of Symptoms:  worsening    Accompanying Signs & Symptoms:  Body ache main areas dull ache both hip joints and knees, legs, tendon right lower arm, achilles pain  at night. Intermittent sharp pain in forearm and knees. Dehydration-always feel thirsty.    Previous history of similar problem:   none    Precipitating factors:   Worsened by: none    Alleviating factors:  Improved by: medication tried helps a little bit    Therapies Tried and outcome: Allegra, probiotic, Rx     11/28/19 use online medical care and treated with microbid but developed shortness of breath. This was changed to sulf and started abdominal burning.  Seen at  12/17/19 for testing and pancreatitis was ruled out but UTI was still present.  Was placed on cipro 12/22/19 and then developed joint pain (Left ankle, right knee and ventral forearm). She does note that she has anxiety      Reviewed and updated as needed this visit by Provider  Tobacco  Allergies  Meds  Problems  Med Hx  Surg Hx  Fam Hx         Review of Systems   ROS COMP: Constitutional, HEENT, cardiovascular, pulmonary, gi and gu systems are negative, except as otherwise noted.      Objective    /70 (BP Location: Right arm, Patient Position: Chair, Cuff Size: Adult Regular)   Pulse 60   Temp 98.4  F (36.9  C) (Oral)   Resp 18   Ht 1.721 m (5' 7.75\")   Wt 73.9 kg (163 lb)   LMP 12/15/2019   BMI 24.97 kg/m    Body mass index is 24.97 kg/m .  Physical Exam   GENERAL: healthy, alert and no distress  NECK: no adenopathy, no asymmetry, masses, or scars and " thyroid normal to palpation  RESP: lungs clear to auscultation - no rales, rhonchi or wheezes  CV: regular rate and rhythm, normal S1 S2, no S3 or S4, no murmur, click or rub, no peripheral edema and peripheral pulses strong  ABDOMEN: soft, nontender, no hepatosplenomegaly, no masses and bowel sounds normal  MS: no gross musculoskeletal defects noted, no edema  NEURO: Normal strength and tone, mentation intact and speech normal  PSYCH: mentation appears normal and anxious    Diagnostic Test Results:  Labs reviewed in Epic        Assessment & Plan     1. Multiple joint pain  Suspect related to Staph bacteria - continue cipro.  Discussed typical tendonopathy. No increased impact activities while on cipro.    2. Anxiety  Reassured and monitor for now.        Return in about 3 days (around 12/29/2019), or if symptoms worsen or fail to improve.    Shayla Borrego MD  [facility]

## 2019-12-26 NOTE — PATIENT INSTRUCTIONS
At [facility], we strive to deliver an exceptional experience to you, every time we see you.  If you receive a survey in the mail, please send us back your thoughts. We really do value your feedback.    Based on your medical history, these are the current health maintenance/preventive care services that you are due for (some may have been done at this visit.)  Health Maintenance Due   Topic Date Due     HIV SCREENING  09/15/1985     PREVENTIVE CARE VISIT  04/05/2019     INFLUENZA VACCINE (1) 09/01/2019         Suggested websites for health information:  Www.[facility].org : Up to date and easily searchable information on multiple topics.  Www.MDCapsule.gov : medication info, interactive tutorials, watch real surgeries online  Www.familydoctor.org : good info from the Academy of Family Physicians  Www.cdc.gov : public health info, travel advisories, epidemics (H1N1)  Www.aap.org : children's health info, normal development, vaccinations  Www.health.[facility].[place].us : [place] dept of health, public health issues in [place], N1N1    Your care team:                            Family Medicine Internal Medicine   MD Rubio Wilson MD Shantel Branch-Fleming, MD Katya Georgiev PA-C Nam Ho, MD Pediatrics   AUGUSTA Canchola, JOSE ANTONIO STREETER CNP   MD Jinny Perera MD Deborah Mielke, MD Kim Thein, APRN CNP      Clinic hours: Monday - Thursday 7 am-7 pm; Fridays 7 am-5 pm.   Urgent care: Monday - Friday 11 am-9 pm; Saturday and Sunday 9 am-5 pm.  Pharmacy : Monday -Thursday 8 am-8 pm; Friday 8 am-6 pm; Saturday and Sunday 9 am-5 pm.     Clinic: [phone]   Pharmacy: [phone]

## 2020-02-24 ENCOUNTER — OFFICE VISIT (OUTPATIENT)
Dept: FAMILY MEDICINE | Facility: CLINIC | Age: 50
End: 2020-02-24

## 2020-02-24 VITALS
RESPIRATION RATE: 16 BRPM | DIASTOLIC BLOOD PRESSURE: 84 MMHG | SYSTOLIC BLOOD PRESSURE: 128 MMHG | TEMPERATURE: 98.2 F | HEART RATE: 60 BPM | BODY MASS INDEX: 24.55 KG/M2 | HEIGHT: 68 IN | WEIGHT: 162 LBS | OXYGEN SATURATION: 100 %

## 2020-02-24 DIAGNOSIS — R07.89 CHEST WALL PAIN: ICD-10-CM

## 2020-02-24 DIAGNOSIS — B37.31 YEAST VAGINITIS: ICD-10-CM

## 2020-02-24 DIAGNOSIS — F41.1 GAD (GENERALIZED ANXIETY DISORDER): Primary | ICD-10-CM

## 2020-02-24 DIAGNOSIS — F51.01 PRIMARY INSOMNIA: ICD-10-CM

## 2020-02-24 PROCEDURE — 99214 OFFICE O/P EST MOD 30 MIN: CPT | Performed by: FAMILY MEDICINE

## 2020-02-24 RX ORDER — TRAZODONE HYDROCHLORIDE 50 MG/1
50 TABLET, FILM COATED ORAL AT BEDTIME
Qty: 90 TABLET | Refills: 1 | Status: SHIPPED | OUTPATIENT
Start: 2020-02-24 | End: 2020-07-23

## 2020-02-24 RX ORDER — FLUCONAZOLE 150 MG/1
150 TABLET ORAL ONCE
Qty: 1 TABLET | Refills: 0 | Status: SHIPPED | OUTPATIENT
Start: 2020-02-24 | End: 2020-02-24

## 2020-02-24 RX ORDER — VENLAFAXINE HYDROCHLORIDE 75 MG/1
75 CAPSULE, EXTENDED RELEASE ORAL DAILY
Qty: 90 CAPSULE | Refills: 1 | Status: SHIPPED | OUTPATIENT
Start: 2020-02-24 | End: 2020-09-09

## 2020-02-24 RX ORDER — ALPRAZOLAM 0.5 MG
TABLET ORAL
Qty: 30 TABLET | Refills: 0 | Status: SHIPPED | OUTPATIENT
Start: 2020-02-24 | End: 2020-05-18

## 2020-02-24 ASSESSMENT — ANXIETY QUESTIONNAIRES
IF YOU CHECKED OFF ANY PROBLEMS ON THIS QUESTIONNAIRE, HOW DIFFICULT HAVE THESE PROBLEMS MADE IT FOR YOU TO DO YOUR WORK, TAKE CARE OF THINGS AT HOME, OR GET ALONG WITH OTHER PEOPLE: SOMEWHAT DIFFICULT
1. FEELING NERVOUS, ANXIOUS, OR ON EDGE: SEVERAL DAYS
7. FEELING AFRAID AS IF SOMETHING AWFUL MIGHT HAPPEN: SEVERAL DAYS
2. NOT BEING ABLE TO STOP OR CONTROL WORRYING: SEVERAL DAYS
GAD7 TOTAL SCORE: 7
3. WORRYING TOO MUCH ABOUT DIFFERENT THINGS: SEVERAL DAYS
5. BEING SO RESTLESS THAT IT IS HARD TO SIT STILL: SEVERAL DAYS
6. BECOMING EASILY ANNOYED OR IRRITABLE: SEVERAL DAYS

## 2020-02-24 ASSESSMENT — MIFFLIN-ST. JEOR: SCORE: 1404.36

## 2020-02-24 ASSESSMENT — PAIN SCALES - GENERAL: PAINLEVEL: NO PAIN (0)

## 2020-02-24 ASSESSMENT — PATIENT HEALTH QUESTIONNAIRE - PHQ9
5. POOR APPETITE OR OVEREATING: SEVERAL DAYS
SUM OF ALL RESPONSES TO PHQ QUESTIONS 1-9: 3

## 2020-02-24 NOTE — PROGRESS NOTES
Subjective     Celina Ledesma is a 49 year old female who presents to clinic today for the following health issues:    HPI   Anxiety Follow-Up    How are you doing with your anxiety since your last visit? No change    Are you having other symptoms that might be associated with anxiety? No    Have you had a significant life event? No     Are you feeling depressed? No    Do you have any concerns with your use of alcohol or other drugs? No    Patient reports that February is the toughest time of the year for her anxiety, she explains that she is aware of it.   Has primarily anxiety regarding health concerns.    Social History     Tobacco Use     Smoking status: Former Smoker     Last attempt to quit: 2000     Years since quittin.1     Smokeless tobacco: Never Used   Substance Use Topics     Alcohol use: No     Drug use: No     YUDI-7 SCORE 2019 10/21/2019 2020   Total Score - - -   Total Score 6 7 7     PHQ 2019 10/21/2019 2020   PHQ-9 Total Score 4 4 3   Q9: Thoughts of better off dead/self-harm past 2 weeks Not at all Not at all Not at all         How many servings of fruits and vegetables do you eat daily?  2-3    On average, how many sweetened beverages do you drink each day (Examples: soda, juice, sweet tea, etc.  Do NOT count diet or artificially sweetened beverages)?   0    How many days per week do you exercise enough to make your heart beat faster? 4    How many minutes a day do you exercise enough to make your heart beat faster? 60 or more    How many days per week do you miss taking your medication? 0    Concern - Breast Concern  Onset: couple months    Description:   Left Breast discomfort    Intensity: 2/10    Progression of Symptoms:  same    Accompanying Signs & Symptoms:  none    Previous history of similar problem:   none    Precipitating factors:   Worsened by: unknonw    Alleviating factors:  Improved by: ibuprofen    Therapies Tried and outcome: ibuprofen    Patient  "explains that she was stretching upwards with her arms above her head and she felt something on the side of her left breast stretch uncomfortably. Since then she has been experiencing pain occasionally when she stretches or moves a certain way that she describes as 'zings'    Dental concerns  Was treated with the dentist today for a root canal.  She is being placed on oral antibiotics for the next month with a plan to complete week the root canal procedure in another couple of weeks.  Is concerned that use of these antibiotics will result in a yeast infection and is requesting a prescription to have in case that occurs.      Reviewed and updated as needed this visit by Provider  Tobacco  Allergies  Meds  Med Hx  Surg Hx  Fam Hx  Soc Hx        Review of Systems   10 point ROS of systems including Constitutional, Eyes, Respiratory, Cardiovascular, Gastroenterology, Genitourinary, Integumentary, Muscularskeletal, Psychiatric were all negative except for pertinent positives noted in my HPI.    This document serves as a record of the services and decisions personally performed and made by Lucero Gonzalez MD. It was created on her behalf by Cierra Morrissey, trained medical scribe. The creation of this document is based on the provider's statements to the medical scribe.        Objective    /84 (BP Location: Right arm, Patient Position: Chair, Cuff Size: Adult Regular)   Pulse 60   Temp 98.2  F (36.8  C) (Oral)   Resp 16   Ht 1.721 m (5' 7.75\")   Wt 73.5 kg (162 lb)   LMP 02/05/2020 (Approximate)   SpO2 100%   Breastfeeding No   BMI 24.81 kg/m    Body mass index is 24.81 kg/m .  Physical Exam   GENERAL: healthy, alert and no distress  NECK: no adenopathy, no asymmetry, masses, or scars and thyroid normal to palpation  RESP: lungs clear to auscultation - no rales, rhonchi or wheezes  BREAST: breast implants present bilaterally.  normal without masses, tenderness or nipple discharge and no palpable " axillary masses or adenopathy.    CV: regular rate and rhythm, normal S1 S2, no S3 or S4, no murmur, click or rub, no peripheral edema and peripheral pulses strong  ABDOMEN: soft, nontender, no hepatosplenomegaly, no masses and bowel sounds normal  MS: no gross musculoskeletal defects noted, no edema. The area of tenderness she describes is actually palpable along the chest wall underneath the breast on the left lateral chest area.  PSYCH:  Mentation normal.  Mild anxiety noted,  no overt depression noted      Diagnostic Test Results:  none         Assessment & Plan     1. YUDI (generalized anxiety disorder)  Controlled with medication, continue current management, refills today, Follow up in 6 months  - venlafaxine (EFFEXOR-XR) 75 MG 24 hr capsule; Take 1 capsule (75 mg) by mouth daily  Dispense: 90 capsule; Refill: 1  - ALPRAZolam (XANAX) 0.5 MG tablet; TAKE 1/2-1 TABLET BY MOUTH 3 TIMES A DAY AS NEEDED FOR ANXIETY  Dispense: 30 tablet; Refill: 0  - traZODone (DESYREL) 50 MG tablet; Take 1 tablet (50 mg) by mouth At Bedtime  Dispense: 90 tablet; Refill: 1    2. Primary insomnia  Controlled, continue taking medication as instructed, refills today  - traZODone (DESYREL) 50 MG tablet; Take 1 tablet (50 mg) by mouth At Bedtime  Dispense: 90 tablet; Refill: 1    3. Yeast vaginitis  Prescription given to use as needed while on the oral antibiotics for dental work.  - fluconazole (DIFLUCAN) 150 MG tablet; Take 1 tablet (150 mg) by mouth once for 1 dose  Dispense: 1 tablet; Refill: 0     4.  Chest wall pain  Treatment of the discomfort with scheduled ibuprofen for the next few days.  Limiting activity that causes the discomfort recommended as well.  There was no direct trauma to the area to suggest the need for imaging.  Follow-up if symptoms fail to resolve.      Patient Instructions   Refill medications for regular use.     I have sent a script for the diflucan use if needed while on the antibiotics.    For the pain.  This appears to be related to the chest wall instead of the breast - limit activities that result in the discomfort.  Ibuprofen 600 mg (3 pills) three times a day with food for 5-7 days may speed the improvement of the muscular pain.              Return in about 3 weeks (around 3/16/2020) for as needed for persistent symptoms.    0 minutes with more than 50 percent of that time used for discussing medical concerns and education    The information in this document, created by the medical scribe, Cierra Morrissey, for me, accurately reflects the services I personally performed and the decisions made by me. I have reviewed and approved this document for accuracy prior to leaving the patient care area. 3:35 PM 2/24/2020    Lucero Gonzalez MD  [facility]

## 2020-02-24 NOTE — PATIENT INSTRUCTIONS
Refill medications for regular use.     I have sent a script for the diflucan use if needed while on the antibiotics.    For the pain. This appears to be related to the chest wall instead of the breast - limit activities that result in the discomfort.  Ibuprofen 600 mg (3 pills) three times a day with food for 5-7 days may speed the improvement of the muscular pain.

## 2020-02-25 ASSESSMENT — ANXIETY QUESTIONNAIRES: GAD7 TOTAL SCORE: 7

## 2020-05-17 DIAGNOSIS — F41.1 GAD (GENERALIZED ANXIETY DISORDER): ICD-10-CM

## 2020-05-18 RX ORDER — ALPRAZOLAM 0.5 MG
TABLET ORAL
Qty: 30 TABLET | Refills: 0 | Status: SHIPPED | OUTPATIENT
Start: 2020-05-18 | End: 2020-10-14

## 2020-05-18 NOTE — TELEPHONE ENCOUNTER
Requested Prescriptions   Pending Prescriptions Disp Refills     ALPRAZolam (XANAX) 0.5 MG tablet [Pharmacy Med Name: ALPRAZOLAM 0.5 MG TABLET] 30 tablet 0     Sig: TAKE 1/2-1 TABLET BY MOUTH 3 TIMES A DAY AS NEEDED FOR ANXIETY       There is no refill protocol information for this order        Routing refill request to provider for review/approval because:  Drug not on the [facility] refill protocol       April Mckeon RN, BSN, PHN

## 2020-06-09 DIAGNOSIS — L70.0 ACNE VULGARIS: ICD-10-CM

## 2020-06-10 RX ORDER — CLINDAMYCIN PHOSPHATE 10 MG/G
GEL TOPICAL
Qty: 60 G | Refills: 1 | Status: SHIPPED | OUTPATIENT
Start: 2020-06-10 | End: 2022-05-12 | Stop reason: ALTCHOICE

## 2020-06-10 NOTE — TELEPHONE ENCOUNTER
Prescription approved per [facility] Refill Protocol.    April Mckeon RN, BSN, PHN

## 2020-07-20 DIAGNOSIS — F51.01 PRIMARY INSOMNIA: ICD-10-CM

## 2020-07-20 DIAGNOSIS — F41.1 GAD (GENERALIZED ANXIETY DISORDER): ICD-10-CM

## 2020-07-23 RX ORDER — TRAZODONE HYDROCHLORIDE 50 MG/1
TABLET, FILM COATED ORAL
Qty: 90 TABLET | Refills: 1 | Status: SHIPPED | OUTPATIENT
Start: 2020-07-23 | End: 2020-12-16

## 2020-09-08 ENCOUNTER — MYC MEDICAL ADVICE (OUTPATIENT)
Dept: FAMILY MEDICINE | Facility: CLINIC | Age: 50
End: 2020-09-08

## 2020-09-09 NOTE — TELEPHONE ENCOUNTER
See [employer] messages below.  Patient asking for blood type. Per review of chart, we do not have this information on file for her, do not see that blood typing/ABO test has ever been ordered within [facility] system. Message sent noting this.    Cat Xiong RN  [facility]

## 2020-10-13 DIAGNOSIS — F41.1 GAD (GENERALIZED ANXIETY DISORDER): ICD-10-CM

## 2020-10-14 RX ORDER — ALPRAZOLAM 0.5 MG
0.5 TABLET ORAL 3 TIMES DAILY PRN
Qty: 30 TABLET | Refills: 0 | Status: SHIPPED | OUTPATIENT
Start: 2020-10-14 | End: 2021-01-13

## 2020-11-07 ENCOUNTER — HEALTH MAINTENANCE LETTER (OUTPATIENT)
Age: 50
End: 2020-11-07

## 2020-12-13 DIAGNOSIS — F41.1 GAD (GENERALIZED ANXIETY DISORDER): ICD-10-CM

## 2020-12-13 DIAGNOSIS — F51.01 PRIMARY INSOMNIA: ICD-10-CM

## 2020-12-14 RX ORDER — VENLAFAXINE HYDROCHLORIDE 75 MG/1
75 CAPSULE, EXTENDED RELEASE ORAL DAILY
Qty: 30 CAPSULE | Refills: 0 | Status: SHIPPED | OUTPATIENT
Start: 2020-12-14 | End: 2021-01-12

## 2020-12-14 NOTE — TELEPHONE ENCOUNTER
Appointment needed - ok for virtual.  Refill x 1 month sent with instructions to follow up.      PENELOPE

## 2020-12-16 RX ORDER — TRAZODONE HYDROCHLORIDE 50 MG/1
50 TABLET, FILM COATED ORAL AT BEDTIME
Qty: 30 TABLET | Refills: 0 | Status: SHIPPED | OUTPATIENT
Start: 2020-12-16 | End: 2021-01-12

## 2021-01-09 DIAGNOSIS — F41.1 GAD (GENERALIZED ANXIETY DISORDER): ICD-10-CM

## 2021-01-09 DIAGNOSIS — F51.01 PRIMARY INSOMNIA: ICD-10-CM

## 2021-01-12 RX ORDER — TRAZODONE HYDROCHLORIDE 50 MG/1
50 TABLET, FILM COATED ORAL AT BEDTIME
Qty: 30 TABLET | Refills: 0 | Status: SHIPPED | OUTPATIENT
Start: 2021-01-12 | End: 2021-01-13

## 2021-01-12 RX ORDER — VENLAFAXINE HYDROCHLORIDE 75 MG/1
CAPSULE, EXTENDED RELEASE ORAL
Qty: 30 CAPSULE | Refills: 0 | Status: SHIPPED | OUTPATIENT
Start: 2021-01-12 | End: 2021-01-13

## 2021-01-13 ENCOUNTER — VIRTUAL VISIT (OUTPATIENT)
Dept: FAMILY MEDICINE | Facility: CLINIC | Age: 51
End: 2021-01-13

## 2021-01-13 DIAGNOSIS — F51.01 PRIMARY INSOMNIA: ICD-10-CM

## 2021-01-13 DIAGNOSIS — Z12.11 COLON CANCER SCREENING: Primary | ICD-10-CM

## 2021-01-13 DIAGNOSIS — Z12.31 ENCOUNTER FOR SCREENING MAMMOGRAM FOR BREAST CANCER: ICD-10-CM

## 2021-01-13 DIAGNOSIS — R91.1 INCIDENTAL PULMONARY NODULE, GREATER THAN OR EQUAL TO 8MM: ICD-10-CM

## 2021-01-13 DIAGNOSIS — F41.1 GAD (GENERALIZED ANXIETY DISORDER): ICD-10-CM

## 2021-01-13 PROCEDURE — 99214 OFFICE O/P EST MOD 30 MIN: CPT | Mod: 95 | Performed by: FAMILY MEDICINE

## 2021-01-13 RX ORDER — ALPRAZOLAM 0.5 MG
0.5 TABLET ORAL 3 TIMES DAILY PRN
Qty: 30 TABLET | Refills: 0 | Status: SHIPPED | OUTPATIENT
Start: 2021-01-13 | End: 2021-02-05

## 2021-01-13 RX ORDER — VENLAFAXINE HYDROCHLORIDE 75 MG/1
75 CAPSULE, EXTENDED RELEASE ORAL DAILY
Qty: 90 CAPSULE | Refills: 1 | Status: SHIPPED | OUTPATIENT
Start: 2021-01-13 | End: 2021-06-11

## 2021-01-13 RX ORDER — TRAZODONE HYDROCHLORIDE 50 MG/1
50 TABLET, FILM COATED ORAL AT BEDTIME
Qty: 90 TABLET | Refills: 1 | Status: SHIPPED | OUTPATIENT
Start: 2021-01-13 | End: 2021-05-28

## 2021-01-13 ASSESSMENT — ANXIETY QUESTIONNAIRES
3. WORRYING TOO MUCH ABOUT DIFFERENT THINGS: SEVERAL DAYS
6. BECOMING EASILY ANNOYED OR IRRITABLE: NOT AT ALL
2. NOT BEING ABLE TO STOP OR CONTROL WORRYING: NOT AT ALL
5. BEING SO RESTLESS THAT IT IS HARD TO SIT STILL: SEVERAL DAYS
IF YOU CHECKED OFF ANY PROBLEMS ON THIS QUESTIONNAIRE, HOW DIFFICULT HAVE THESE PROBLEMS MADE IT FOR YOU TO DO YOUR WORK, TAKE CARE OF THINGS AT HOME, OR GET ALONG WITH OTHER PEOPLE: SOMEWHAT DIFFICULT
GAD7 TOTAL SCORE: 6
1. FEELING NERVOUS, ANXIOUS, OR ON EDGE: MORE THAN HALF THE DAYS
7. FEELING AFRAID AS IF SOMETHING AWFUL MIGHT HAPPEN: SEVERAL DAYS

## 2021-01-13 ASSESSMENT — PATIENT HEALTH QUESTIONNAIRE - PHQ9: 5. POOR APPETITE OR OVEREATING: SEVERAL DAYS

## 2021-01-13 NOTE — PROGRESS NOTES
Celina is a 50 year old who is being evaluated via a billable video visit.      How would you like to obtain your AVS? MyChart  If the video visit is dropped, the invitation should be resent by: Text to cell phone: [phone]  Will anyone else be joining your video visit? No      Video Start Time: 9:00 AM  Assessment & Plan     YUDI (generalized anxiety disorder)  Symptoms controlled with current treatment.  Refills are sent.  Follow-up in 6 months  - ALPRAZolam (XANAX) 0.5 MG tablet; Take 1 tablet (0.5 mg) by mouth 3 times daily as needed for anxiety  - traZODone (DESYREL) 50 MG tablet; Take 1 tablet (50 mg) by mouth At Bedtime  - venlafaxine (EFFEXOR-XR) 75 MG 24 hr capsule; Take 1 capsule (75 mg) by mouth daily    Primary insomnia  Continue trazodone as previous.  Controlling sleep issues  - traZODone (DESYREL) 50 MG tablet; Take 1 tablet (50 mg) by mouth At Bedtime    Colon cancer screening  Colon cancer screening is recommended orders placed.  - GASTROENTEROLOGY ADULT REF PROCEDURE ONLY; Future    Breast cancer screening  Mammogram with alva ordered      Incidental pulmonary nodule, greater than or equal to 8mm  Reviewed results with her again at today's visit.  Plan for follow-up CT scan in September 2021 and again in September 2023.  If the nodule does not change in that interval no additional follow-up would be needed.                32 minutes spent on the date of the encounter doing chart review, history and exam, documentation and further activities as noted above           Patient Instructions   Refill of trazodone, venlafaxine, and Xanax are sent to the pharmacy now.    Order placed for colonoscopy and mammogram.   You can call [phone] to schedule this at the ealth building in [place]      Follow-up chest CT is planned in September 2021      Return in about 6 months (around 7/13/2021) for chronic health problems, Physical Exam, Lab Work.    Lucero Gonzalez MD  [facility]  TALIA Patrick is a 50 year old who presents to clinic today for the following health issues     HPI       Anxiety Follow-Up    How are you doing with your anxiety since your last visit? Improved with meds    Are you having other symptoms that might be associated with anxiety? No    Have you had a significant life event? No     Are you feeling depressed? No    Do you have any concerns with your use of alcohol or other drugs? No  Taking trazodone nightly - sleeping well,  No side effects.    Taking xanax 2-3 times a week 1/2 -1 pill per dose.  Worse around the hormone changes 2 weeks prior to menses with hip pain and anxiety symptoms.  Social History     Tobacco Use     Smoking status: Former Smoker     Quit date: 2000     Years since quittin.0     Smokeless tobacco: Never Used   Substance Use Topics     Alcohol use: No     Drug use: No     YUDI-7 SCORE 10/21/2019 2020 2021   Total Score - - -   Total Score 7 7 6     PHQ 2019 10/21/2019 2020   PHQ-9 Total Score 4 4 3   Q9: Thoughts of better off dead/self-harm past 2 weeks Not at all Not at all Not at all     YUDI-7  2021   1. Feeling nervous, anxious, or on edge 2   2. Not being able to stop or control worrying 0   3. Worrying too much about different things 1   4. Trouble relaxing 1   5. Being so restless that it is hard to sit still 1   6. Becoming easily annoyed or irritable 0   7. Feeling afraid, as if something awful might happen 1   YUDI-7 Total Score 6   If you checked any problems, how difficult have they made it for you to do your work, take care of things at home, or get along with other people? Somewhat difficult         How many servings of fruits and vegetables do you eat daily?  2-3    On average, how many sweetened beverages do you drink each day (Examples: soda, juice, sweet tea, etc.  Do NOT count diet or artificially sweetened beverages)?   0    How many days per week do you exercise enough to  make your heart beat faster? 7    How many minutes a day do you exercise enough to make your heart beat faster? 60 or more    How many days per week do you miss taking your medication? 0    HME: Overdue for mammogram and due for colonoscopy at this time.  Order is available for both.    Lung nodule: Initially noted in October 2018.  Follow-up in September 2019 was unchanged.  Additional follow-up every 2 years is recommended until 5 years of stability was noted per Fleischner Society guidelines.    Review of Systems   Constitutional, HEENT, cardiovascular, pulmonary, gi and gu systems are negative, except as otherwise noted.      Objective           Vitals:  No vitals were obtained today due to virtual visit.    Physical Exam   GENERAL: Healthy, alert and no distress  EYES: Eyes grossly normal to inspection.  No discharge or erythema, or obvious scleral/conjunctival abnormalities.  RESP: No audible wheeze, cough, or visible cyanosis.  No visible retractions or increased work of breathing.    SKIN: Visible skin clear. No significant rash, abnormal pigmentation or lesions.  NEURO: Cranial nerves grossly intact.  Mentation and speech appropriate for age.  PSYCH: Mentation appears normal, affect normal/bright, judgement and insight intact, normal speech and appearance well-groomed.                Video-Visit Details    Type of service:  Video Visit    Video End Time:9:16 AM    Originating Location (pt. Location): Other work    Distant Location (provider location):  [facility]     Platform used for Video Visit: Bouf

## 2021-01-13 NOTE — PATIENT INSTRUCTIONS
Refill of trazodone, venlafaxine, and Xanax are sent to the pharmacy now.    Order placed for colonoscopy and mammogram.   You can call [phone] to schedule this at the ealth building in [place]      Follow-up chest CT is planned in September 2021

## 2021-01-14 ASSESSMENT — ANXIETY QUESTIONNAIRES: GAD7 TOTAL SCORE: 6

## 2021-02-05 ENCOUNTER — HOSPITAL ENCOUNTER (OUTPATIENT)
Facility: AMBULATORY SURGERY CENTER | Age: 51
End: 2021-02-05
Attending: INTERNAL MEDICINE
Payer: COMMERCIAL

## 2021-02-05 DIAGNOSIS — F41.1 GAD (GENERALIZED ANXIETY DISORDER): ICD-10-CM

## 2021-02-05 RX ORDER — ALPRAZOLAM 0.5 MG
0.5 TABLET ORAL 3 TIMES DAILY PRN
Qty: 30 TABLET | Refills: 0 | Status: SHIPPED | OUTPATIENT
Start: 2021-02-05 | End: 2021-04-15

## 2021-02-05 NOTE — TELEPHONE ENCOUNTER
Routing refill request to provider for review/approval because:  Drug not on the [facility] refill protocol       April Mckeon RN, BSN, PHN

## 2021-02-09 ENCOUNTER — MYC MEDICAL ADVICE (OUTPATIENT)
Dept: FAMILY MEDICINE | Facility: CLINIC | Age: 51
End: 2021-02-09

## 2021-02-09 DIAGNOSIS — Z12.11 COLON CANCER SCREENING: Primary | ICD-10-CM

## 2021-02-15 DIAGNOSIS — Z11.59 ENCOUNTER FOR SCREENING FOR OTHER VIRAL DISEASES: Primary | ICD-10-CM

## 2021-02-24 ENCOUNTER — APPOINTMENT (OUTPATIENT)
Dept: LAB | Facility: CLINIC | Age: 51
End: 2021-02-24

## 2021-02-26 ENCOUNTER — ANCILLARY PROCEDURE (OUTPATIENT)
Dept: MAMMOGRAPHY | Facility: CLINIC | Age: 51
End: 2021-02-26
Attending: FAMILY MEDICINE

## 2021-02-26 DIAGNOSIS — Z12.31 ENCOUNTER FOR SCREENING MAMMOGRAM FOR BREAST CANCER: ICD-10-CM

## 2021-02-26 PROCEDURE — 77063 BREAST TOMOSYNTHESIS BI: CPT | Performed by: RADIOLOGY

## 2021-02-26 PROCEDURE — 77067 SCR MAMMO BI INCL CAD: CPT | Performed by: RADIOLOGY

## 2021-04-15 ENCOUNTER — VIRTUAL VISIT (OUTPATIENT)
Dept: FAMILY MEDICINE | Facility: CLINIC | Age: 51
End: 2021-04-15

## 2021-04-15 DIAGNOSIS — K21.00 GASTROESOPHAGEAL REFLUX DISEASE WITH ESOPHAGITIS WITHOUT HEMORRHAGE: ICD-10-CM

## 2021-04-15 DIAGNOSIS — K29.70 GASTRITIS WITHOUT BLEEDING, UNSPECIFIED CHRONICITY, UNSPECIFIED GASTRITIS TYPE: Primary | ICD-10-CM

## 2021-04-15 DIAGNOSIS — F41.1 GAD (GENERALIZED ANXIETY DISORDER): ICD-10-CM

## 2021-04-15 PROCEDURE — 99214 OFFICE O/P EST MOD 30 MIN: CPT | Mod: 95 | Performed by: FAMILY MEDICINE

## 2021-04-15 RX ORDER — ALPRAZOLAM 0.5 MG
0.5 TABLET ORAL 3 TIMES DAILY PRN
Qty: 30 TABLET | Refills: 0 | Status: SHIPPED | OUTPATIENT
Start: 2021-04-15 | End: 2021-08-20

## 2021-04-15 RX ORDER — PANCRELIPASE 15000; 3000; 9500 [USP'U]/1; [USP'U]/1; [USP'U]/1
1 CAPSULE, DELAYED RELEASE ORAL
Qty: 90 CAPSULE | Refills: 1 | Status: SHIPPED | OUTPATIENT
Start: 2021-04-15 | End: 2022-05-12

## 2021-04-15 RX ORDER — PANTOPRAZOLE SODIUM 40 MG/1
40 TABLET, DELAYED RELEASE ORAL DAILY
Qty: 30 TABLET | Refills: 1 | Status: SHIPPED | OUTPATIENT
Start: 2021-04-15 | End: 2021-12-02

## 2021-04-15 NOTE — PATIENT INSTRUCTIONS
Begin Protonix for use 30-60 min prior to a meal daily.     Continue Creon at previous dosage.     Release of information for records from [facility] will be at the  for signing.    Referral back to Dr. Ewing re: pancreatitis follow up.    Refill xanax for prn use.

## 2021-04-15 NOTE — PROGRESS NOTES
Celina is a 50 year old who is being evaluated via a billable video visit.      How would you like to obtain your AVS? MyChart  If the video visit is dropped, the invitation should be resent by: Text to cell phone: x [phone]   Will anyone else be joining your video visit? No    Video Start Time: 2:50 PM    Assessment & Plan     Gastritis without bleeding, unspecified chronicity, unspecified gastritis type  Gastroesophageal reflux disease with esophagitis without hemorrhage  The burning quality to the pain as well as the location in the epigastric area would suggest a possible source of pain in the stomach.  Was intermittently taking omeprazole but not consistently.  I given her prescription for Protonix to use on a daily basis for the next couple of weeks to see if we can calm things down.  - pantoprazole (PROTONIX) 40 MG EC tablet; Take 1 tablet (40 mg) by mouth daily 30-60 min prior to a meal    Pancreatitis, recurrent  Was given a prescription for Creon at the emergency department in [place] refill of this is given.  Referral to follow-up with Dr. Ewing given.  Release of informatio for records from [facility] planned.  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  This will be completed in the next few weeks.  I given her a refill of Xanax to use on an as-needed basis during this time.  - ALPRAZolam (XANAX) 0.5 MG tablet; Take 1 tablet (0.5 mg) by mouth 3 times daily as needed for anxiety      I spent a total of 30 minutes on the day of the visit.   Time spent doing chart review, history and exam, documentation and further activities per the note       Patient Instructions   Begin Protonix for use 30-60 min prior to a meal daily.     Continue Creon at previous dosage.     Release of  information for records from [place] hospital will be at the  for signing.    Referral back to Dr. Ewing re: pancreatitis follow up.    Refill xanax for prn use.        Return in about 4 weeks (around 5/13/2021) for as needed for persistent symptoms.    Lucero Gonzalez MD  [facility] TALIA Patrick is a 50 year old who presents for the following health issues     HPI   Stress - business closing.  Anxiety related to this.  Will be closed in the next 2-3 weeks.  (gym, unable to continue due to COVID restrictions)  Abdominal pain while in [place], [place]  Bad stomach ache - history of pancreatitis.  Last episode 15 years ago.   Went to hospital while in [place] - ED - pain medications, hospitalized overnight.  MRI, lipase normal.  Pain medications for a few doses.   No diarrhea. More likely to be constipated.    Pain in the mid abdomen - rib area.  Burning sensation.  Chills and cold in ED.  Emesis.  Still having some symptoms, not completely right.  Intermittent.  Upper abdomen/epigastric area - reports similar area to past pancreatitis.  Does have some heartburn symptoms.  Previously saw Dr. Ewing for her pancreatitis issues - has not recently.  Was using omeprazole intermittently but has stopped now        Review of Systems   Constitutional, HEENT, cardiovascular, pulmonary, gi and gu systems are negative, except as otherwise noted.      Objective           Vitals:  No vitals were obtained today due to virtual visit.    Physical Exam   GENERAL: Healthy, alert and no distress  EYES: Eyes grossly normal to inspection.  No discharge or erythema, or obvious scleral/conjunctival abnormalities.  RESP: No audible wheeze, cough, or visible cyanosis.  No visible retractions or increased work of breathing.    SKIN: Visible skin clear. No significant rash, abnormal pigmentation or lesions.  NEURO: Cranial nerves grossly intact.  Mentation and speech appropriate for age.  PSYCH: Mentation  appears normal, affect normal/bright, judgement and insight intact, normal speech and appearance well-groomed.  Mild anxiety                Video-Visit Details    Type of service:  Video Visit    Video End Time:3:19 PM    Originating Location (pt. Location): Home    Distant Location (provider location):  [facility]     Platform used for Video Visit: DocuSign      This chart was documented by provider using a voice activated software called Dragon in addition to manual typing. There may be vocabulary errors or other grammatical errors due to this.

## 2021-04-16 ENCOUNTER — TELEPHONE (OUTPATIENT)
Dept: FAMILY MEDICINE | Facility: CLINIC | Age: 51
End: 2021-04-16

## 2021-04-16 NOTE — TELEPHONE ENCOUNTER
Please place a release of information at the  for patient to come in and sign.  She will bring the location to send it with her.  [facility], discharge summary, labs, radiology results  Fax when completed.   NATALIIAK

## 2021-04-19 ENCOUNTER — TELEPHONE (OUTPATIENT)
Dept: GASTROENTEROLOGY | Facility: CLINIC | Age: 51
End: 2021-04-19

## 2021-04-19 NOTE — LETTER
May 10, 2021    Celina Ledesma                              [address] [place] [address]    Dear Celina,      Our office recently received a referral from Lucero Gonzalez related to your healthcare. We have reviewed your records and imaging and have suggestions on how we would proceed. We have made several attempts to contact you but have been unable to reach you. Your referral will be closed at this time and we will not make further attempts to contact you.     Your healthcare is important to us. If you wish to follow up on this referral please contact our office at [phone].     Roslyn Biggs RN Care Coordinator

## 2021-04-20 NOTE — TELEPHONE ENCOUNTER
[facility]     Referring Provider: Lucero Gonzalez    Referred to: [facility]     Provider Requested: Gildardo     Referral Received: 4/15/21     Records received: none regarding recent pancreatitis flare      Images received: none    Evaluation for: recurrent abdominal pain, history pancreatitis in past    Clinical History (per RN review):     ER follow up, clinic visit 4/15/21  Pancreatitis, recurrent  Was given a prescription for Creon at the emergency department in [place] refill of this is given.  Referral to follow-up with Dr. Ewing given.  Release of informatio for records from [facility] planned.  - pancrelipase, lip-prot-amyl, 3000 UNITS (CREON) CPEP; Take 1 capsule by mouth 3 times daily (with meals)  - GASTROENTEROLOGY ADULT REF CONSULT ONLY; Future    No records received from [facility] in [place], no other documented pancreatitis in [facility]    MD review date:   MD Decision for clinic consultation/Orders:            Referral updates/Patient contacted: 5/4/21- called to disucss lack of records, information.   5/10/21 left message sent letter sent.

## 2021-05-18 DIAGNOSIS — Z12.11 COLON CANCER SCREENING: ICD-10-CM

## 2021-05-18 PROCEDURE — 82274 ASSAY TEST FOR BLOOD FECAL: CPT | Performed by: FAMILY MEDICINE

## 2021-05-23 LAB — HEMOCCULT STL QL IA: NEGATIVE

## 2021-06-11 ENCOUNTER — MYC MEDICAL ADVICE (OUTPATIENT)
Dept: FAMILY MEDICINE | Facility: CLINIC | Age: 51
End: 2021-06-11

## 2021-06-11 DIAGNOSIS — F41.1 GAD (GENERALIZED ANXIETY DISORDER): ICD-10-CM

## 2021-06-11 RX ORDER — VENLAFAXINE HYDROCHLORIDE 75 MG/1
75 CAPSULE, EXTENDED RELEASE ORAL DAILY
Qty: 7 CAPSULE | Refills: 0 | Status: SHIPPED | OUTPATIENT
Start: 2021-06-11 | End: 2021-06-16

## 2021-06-11 NOTE — TELEPHONE ENCOUNTER
"Requested Prescriptions   Pending Prescriptions Disp Refills     venlafaxine (EFFEXOR-XR) 75 MG 24 hr capsule 7 capsule 0     Sig: Take 1 capsule (75 mg) by mouth daily       Serotonin-Norepinephrine Reuptake Inhibitors  Failed - 6/11/2021  8:51 AM        Failed - Blood pressure under 140/90 in past 12 months     BP Readings from Last 3 Encounters:   02/24/20 128/84   12/26/19 119/70   12/17/19 129/58                 Failed - Normal serum creatinine on file in past 12 months     Recent Labs   Lab Test 12/17/19  1853   CR 0.71       Ok to refill medication if creatinine is low          Passed - Recent (12 mo) or future (30 days) visit within the authorizing provider's specialty     Patient has had an office visit with the authorizing provider or a provider within the authorizing providers department within the previous 12 mos or has a future within next 30 days. See \"Patient Info\" tab in inbasket, or \"Choose Columns\" in Meds & Orders section of the refill encounter.              Passed - Medication is active on med list        Passed - Patient is age 18 or older        Passed - No active pregnancy on record        Passed - No positive pregnancy test in past 12 months             "

## 2021-06-15 ENCOUNTER — MYC MEDICAL ADVICE (OUTPATIENT)
Dept: FAMILY MEDICINE | Facility: CLINIC | Age: 51
End: 2021-06-15

## 2021-06-15 DIAGNOSIS — F41.1 GAD (GENERALIZED ANXIETY DISORDER): ICD-10-CM

## 2021-06-16 RX ORDER — VENLAFAXINE HYDROCHLORIDE 75 MG/1
75 CAPSULE, EXTENDED RELEASE ORAL DAILY
Qty: 30 CAPSULE | Refills: 0 | Status: SHIPPED | OUTPATIENT
Start: 2021-06-16 | End: 2021-07-03

## 2021-06-16 RX ORDER — VENLAFAXINE HYDROCHLORIDE 75 MG/1
75 CAPSULE, EXTENDED RELEASE ORAL DAILY
Qty: 90 CAPSULE | OUTPATIENT
Start: 2021-06-16

## 2021-06-16 NOTE — TELEPHONE ENCOUNTER
"Routing refill request to provider for review/approval because:  See failed protocol below      Requested Prescriptions   Pending Prescriptions Disp Refills     venlafaxine (EFFEXOR-XR) 75 MG 24 hr capsule 90 capsule      Sig: Take 1 capsule (75 mg) by mouth daily       Serotonin-Norepinephrine Reuptake Inhibitors  Failed - 6/16/2021 11:04 AM        Failed - Blood pressure under 140/90 in past 12 months     BP Readings from Last 3 Encounters:   02/24/20 128/84   12/26/19 119/70   12/17/19 129/58                 Failed - Normal serum creatinine on file in past 12 months     Recent Labs   Lab Test 12/17/19  1853   CR 0.71       Ok to refill medication if creatinine is low          Passed - Recent (12 mo) or future (30 days) visit within the authorizing provider's specialty     Patient has had an office visit with the authorizing provider or a provider within the authorizing providers department within the previous 12 mos or has a future within next 30 days. See \"Patient Info\" tab in inbasket, or \"Choose Columns\" in Meds & Orders section of the refill encounter.              Passed - Medication is active on med list        Passed - Patient is age 18 or older        Passed - No active pregnancy on record        Passed - No positive pregnancy test in past 12 months           Kisha Corea RN  [facility]    "

## 2021-07-01 DIAGNOSIS — F41.1 GAD (GENERALIZED ANXIETY DISORDER): ICD-10-CM

## 2021-07-02 NOTE — TELEPHONE ENCOUNTER
"Requested Prescriptions   Pending Prescriptions Disp Refills     venlafaxine (EFFEXOR-XR) 75 MG 24 hr capsule [Pharmacy Med Name: VENLAFAXINE HCL ER 75 MG CAP] 90 capsule 1     Sig: TAKE 1 CAPSULE BY MOUTH EVERY DAY       Serotonin-Norepinephrine Reuptake Inhibitors  Failed - 7/1/2021  7:42 PM        Failed - Blood pressure under 140/90 in past 12 months     BP Readings from Last 3 Encounters:   02/24/20 128/84   12/26/19 119/70   12/17/19 129/58                 Failed - Normal serum creatinine on file in past 12 months     Recent Labs   Lab Test 12/17/19  1853   CR 0.71       Ok to refill medication if creatinine is low          Passed - Recent (12 mo) or future (30 days) visit within the authorizing provider's specialty     Patient has had an office visit with the authorizing provider or a provider within the authorizing providers department within the previous 12 mos or has a future within next 30 days. See \"Patient Info\" tab in inbasket, or \"Choose Columns\" in Meds & Orders section of the refill encounter.              Passed - Medication is active on med list        Passed - Patient is age 18 or older        Passed - No active pregnancy on record        Passed - No positive pregnancy test in past 12 months           Marilee DENGN, RN    "

## 2021-07-03 RX ORDER — VENLAFAXINE HYDROCHLORIDE 75 MG/1
CAPSULE, EXTENDED RELEASE ORAL
Qty: 90 CAPSULE | Refills: 0 | Status: SHIPPED | OUTPATIENT
Start: 2021-07-03 | End: 2021-08-20

## 2021-07-03 NOTE — TELEPHONE ENCOUNTER
Follow up due in October.  PSK    PHQ 5/7/2019 10/21/2019 2/24/2020   PHQ-9 Total Score 4 4 3   Q9: Thoughts of better off dead/self-harm past 2 weeks Not at all Not at all Not at all     YUDI-7 SCORE 10/21/2019 2/24/2020 1/13/2021   Total Score - - -   Total Score 7 7 6

## 2021-08-20 ENCOUNTER — MYC REFILL (OUTPATIENT)
Dept: FAMILY MEDICINE | Facility: CLINIC | Age: 51
End: 2021-08-20

## 2021-08-20 DIAGNOSIS — F41.1 GAD (GENERALIZED ANXIETY DISORDER): ICD-10-CM

## 2021-08-20 RX ORDER — ALPRAZOLAM 0.5 MG
0.5 TABLET ORAL 3 TIMES DAILY PRN
Qty: 30 TABLET | Refills: 0 | Status: SHIPPED | OUTPATIENT
Start: 2021-08-20 | End: 2021-11-08

## 2021-08-20 RX ORDER — VENLAFAXINE HYDROCHLORIDE 75 MG/1
75 CAPSULE, EXTENDED RELEASE ORAL DAILY
Qty: 60 CAPSULE | Refills: 0 | Status: SHIPPED | OUTPATIENT
Start: 2021-08-20 | End: 2021-09-14

## 2021-08-20 NOTE — TELEPHONE ENCOUNTER
Routing refill request to provider for review/approval because:  Needs current labs       Mary Kay Merino RN, BSN   [facility]

## 2021-08-20 NOTE — TELEPHONE ENCOUNTER
Routing refill request to provider for review/approval because:  Drug not on the [facility] refill protocol   Mayela Hawkins BSN, RN

## 2021-08-23 RX ORDER — VENLAFAXINE HYDROCHLORIDE 75 MG/1
CAPSULE, EXTENDED RELEASE ORAL
Qty: 90 CAPSULE | OUTPATIENT
Start: 2021-08-23

## 2021-08-23 NOTE — TELEPHONE ENCOUNTER
Routing refill request to provider for review/approval because:  Labs out of range:  BP, Creatinine    Jennifer Lara RN

## 2021-09-01 ENCOUNTER — ANCILLARY PROCEDURE (OUTPATIENT)
Dept: CT IMAGING | Facility: CLINIC | Age: 51
End: 2021-09-01
Attending: FAMILY MEDICINE
Payer: COMMERCIAL

## 2021-09-01 DIAGNOSIS — R91.1 INCIDENTAL PULMONARY NODULE, GREATER THAN OR EQUAL TO 8MM: ICD-10-CM

## 2021-09-01 PROCEDURE — 71250 CT THORAX DX C-: CPT | Mod: GC | Performed by: RADIOLOGY

## 2021-09-02 NOTE — RESULT ENCOUNTER NOTE
The nodule in the left upper lobe is not changed compared with previous.  Follow up in 2 years is recommended.   Please call or MyChart message me if you have any questions.      PSK

## 2021-09-05 ENCOUNTER — HEALTH MAINTENANCE LETTER (OUTPATIENT)
Age: 51
End: 2021-09-05

## 2021-09-13 ENCOUNTER — OFFICE VISIT (OUTPATIENT)
Dept: FAMILY MEDICINE | Facility: CLINIC | Age: 51
End: 2021-09-13
Payer: COMMERCIAL

## 2021-09-13 VITALS
OXYGEN SATURATION: 100 % | RESPIRATION RATE: 18 BRPM | HEIGHT: 68 IN | DIASTOLIC BLOOD PRESSURE: 82 MMHG | TEMPERATURE: 98.6 F | BODY MASS INDEX: 25.07 KG/M2 | HEART RATE: 84 BPM | SYSTOLIC BLOOD PRESSURE: 132 MMHG | WEIGHT: 165.4 LBS

## 2021-09-13 DIAGNOSIS — N89.8 VAGINAL ODOR: Primary | ICD-10-CM

## 2021-09-13 DIAGNOSIS — F41.1 GAD (GENERALIZED ANXIETY DISORDER): ICD-10-CM

## 2021-09-13 DIAGNOSIS — R30.0 DYSURIA: ICD-10-CM

## 2021-09-13 LAB
ALBUMIN UR-MCNC: NEGATIVE MG/DL
APPEARANCE UR: CLEAR
BILIRUB UR QL STRIP: NEGATIVE
CLUE CELLS: ABNORMAL
COLOR UR AUTO: YELLOW
GLUCOSE UR STRIP-MCNC: NEGATIVE MG/DL
HGB UR QL STRIP: NEGATIVE
KETONES UR STRIP-MCNC: NEGATIVE MG/DL
LEUKOCYTE ESTERASE UR QL STRIP: NEGATIVE
NITRATE UR QL: NEGATIVE
PH UR STRIP: 7 [PH] (ref 5–7)
SP GR UR STRIP: 1.01 (ref 1–1.03)
TRICHOMONAS, WET PREP: ABNORMAL
UROBILINOGEN UR STRIP-ACNC: 0.2 E.U./DL
WBC'S/HIGH POWER FIELD, WET PREP: ABNORMAL
YEAST, WET PREP: ABNORMAL

## 2021-09-13 PROCEDURE — 81003 URINALYSIS AUTO W/O SCOPE: CPT | Performed by: FAMILY MEDICINE

## 2021-09-13 PROCEDURE — 99213 OFFICE O/P EST LOW 20 MIN: CPT | Performed by: FAMILY MEDICINE

## 2021-09-13 PROCEDURE — 87210 SMEAR WET MOUNT SALINE/INK: CPT | Performed by: FAMILY MEDICINE

## 2021-09-13 RX ORDER — IVERMECTIN 3 MG/1
TABLET ORAL
Qty: 5 TABLET | Refills: 0 | COMMUNITY
Start: 2021-09-13 | End: 2022-05-12

## 2021-09-13 ASSESSMENT — MIFFLIN-ST. JEOR: SCORE: 1418.75

## 2021-09-13 NOTE — PATIENT INSTRUCTIONS
I will send the results of urine and vaginal testing when available with oral medication if needed.

## 2021-09-13 NOTE — PROGRESS NOTES
"    Assessment & Plan     Vaginal odor  WBC without yeast or BV noted.  Local irritation from recent tampon use or condom use possible.  No current prescription needed - follow up symptoms. Be sure to change tampons frequently.    - Wet prep - lab collect; Future  - Wet prep - lab collect    Dysuria  Normal urine -monitor symptoms.   - UA Macro with Reflex to Micro and Culture - lab collect; Future  - UA Macro with Reflex to Micro and Culture - lab collect    YUDI (generalized anxiety disorder)  Symptoms controlled adequately.  Continue current preventative treatment with effexor and prn xanax for symptoms.   - venlafaxine (EFFEXOR-XR) 75 MG 24 hr capsule; Take 1 capsule (75 mg) by mouth daily             BMI:   Estimated body mass index is 25.15 kg/m  as calculated from the following:    Height as of this encounter: 1.727 m (5' 8\").    Weight as of this encounter: 75 kg (165 lb 6.4 oz).   Weight management plan: Discussed healthy diet and exercise guidelines    Patient Instructions   I will send the results of urine and vaginal testing when available with oral medication if needed.            Return in about 6 months (around 3/13/2022) for recheck mood.    Lucero Gonzalez MD  [facility]   Celina is a 50 year old who presents for the following health issues     HPI     Vaginal Symptoms  Onset/Duration: 2-3 weeks  Description:  Vaginal Discharge: none   Itching (Pruritis): a little  Burning sensation:  YES  Odor: YES  Accompanying Signs & Symptoms:  Urinary symptoms: possible cloudy urine  Abdominal pain: no  Fever: no  History:   Sexually active: YES  New Partner: no  Possibility of Pregnancy:  no  Recent antibiotic use: no  Previous vaginitis issues: no  Precipitating or alleviating factors: None  Therapies tried and outcome: cranberry gummies, two times in last 24 hr rinsed with water and hydrogen peroxide.    GRAY Joseph    Started after intercourse 2-3 week " "ago.  Rinsed with hydrogen peroxide.  Mild burning - occasionally with urinating.      Anxiety more 2 weeks prior to menses.  Taking 1/2 xanax prn only.      Anxiety Follow-Up    How are you doing with your anxiety since your last visit? No change    Are you having other symptoms that might be associated with anxiety? No    Have you had a significant life event? No     Are you feeling depressed? No    Do you have any concerns with your use of alcohol or other drugs? No    Social History     Tobacco Use     Smoking status: Former Smoker     Quit date: 2000     Years since quittin.7     Smokeless tobacco: Never Used   Substance Use Topics     Alcohol use: No     Drug use: No     YUDI-7 SCORE 2020   Total Score - - -   Total Score 7 6 6     PHQ 10/21/2019 2020 2021   PHQ-9 Total Score 4 3 3   Q9: Thoughts of better off dead/self-harm past 2 weeks Not at all Not at all Not at all               Review of Systems   Constitutional, HEENT, cardiovascular, pulmonary, gi and gu systems are negative, except as otherwise noted.      Objective    /82 (BP Location: Right arm)   Pulse 84   Temp 98.6  F (37  C)   Resp 18   Ht 1.727 m (5' 8\")   Wt 75 kg (165 lb 6.4 oz)   SpO2 100%   BMI 25.15 kg/m    Body mass index is 25.15 kg/m .  Physical Exam   GENERAL: healthy, alert and no distress  NECK: no adenopathy, no asymmetry, masses, or scars and thyroid normal to palpation  RESP: lungs clear to auscultation - no rales, rhonchi or wheezes  CV: regular rate and rhythm, normal S1 S2, no S3 or S4, no murmur, click or rub, no peripheral edema and peripheral pulses strong  MS: no gross musculoskeletal defects noted, no edema  BACK: no CVA tenderness, no paralumbar tenderness  PSYCH: mentation appears normal, affect normal/bright    Results for orders placed or performed in visit on 21   UA Macro with Reflex to Micro and Culture - lab collect     Status: Normal    Specimen: Urine, " Clean Catch   Result Value Ref Range    Color Urine Yellow Colorless, Straw, Light Yellow, Yellow    Appearance Urine Clear Clear    Glucose Urine Negative Negative mg/dL    Bilirubin Urine Negative Negative    Ketones Urine Negative Negative mg/dL    Specific Gravity Urine 1.015 1.003 - 1.035    Blood Urine Negative Negative    pH Urine 7.0 5.0 - 7.0    Protein Albumin Urine Negative Negative mg/dL    Urobilinogen Urine 0.2 0.2, 1.0 E.U./dL    Nitrite Urine Negative Negative    Leukocyte Esterase Urine Negative Negative    Narrative    Microscopic not indicated   Wet prep - lab collect     Status: Abnormal    Specimen: Vagina; Swab   Result Value Ref Range    Trichomonas Absent Absent    Yeast Absent Absent    Clue Cells Absent Absent    WBCs/high power field 1+ (A) None

## 2021-09-13 NOTE — RESULT ENCOUNTER NOTE
Your urine testing is normal as we discussed.   The vaginal testing does not show any yeast or bacterial infection.  There are some WBC's which would indicate inflammation or irritation.  These symptoms may be related to condom use or tampon use.  I would recommend use of the vaginal gel that you showed me in the office for symptoms.  Let me know if that does not resolve the symptoms.    Please call or MyChart message me if you have any questions.      PENELOPE

## 2021-09-14 RX ORDER — VENLAFAXINE HYDROCHLORIDE 75 MG/1
75 CAPSULE, EXTENDED RELEASE ORAL DAILY
Qty: 90 CAPSULE | Refills: 1 | Status: SHIPPED | OUTPATIENT
Start: 2021-09-14 | End: 2022-04-28

## 2021-09-14 ASSESSMENT — ANXIETY QUESTIONNAIRES
GAD7 TOTAL SCORE: 6
5. BEING SO RESTLESS THAT IT IS HARD TO SIT STILL: SEVERAL DAYS
7. FEELING AFRAID AS IF SOMETHING AWFUL MIGHT HAPPEN: SEVERAL DAYS
3. WORRYING TOO MUCH ABOUT DIFFERENT THINGS: SEVERAL DAYS
IF YOU CHECKED OFF ANY PROBLEMS ON THIS QUESTIONNAIRE, HOW DIFFICULT HAVE THESE PROBLEMS MADE IT FOR YOU TO DO YOUR WORK, TAKE CARE OF THINGS AT HOME, OR GET ALONG WITH OTHER PEOPLE: SOMEWHAT DIFFICULT
6. BECOMING EASILY ANNOYED OR IRRITABLE: NOT AT ALL
2. NOT BEING ABLE TO STOP OR CONTROL WORRYING: SEVERAL DAYS
1. FEELING NERVOUS, ANXIOUS, OR ON EDGE: SEVERAL DAYS

## 2021-09-14 ASSESSMENT — PATIENT HEALTH QUESTIONNAIRE - PHQ9
SUM OF ALL RESPONSES TO PHQ QUESTIONS 1-9: 3
5. POOR APPETITE OR OVEREATING: SEVERAL DAYS

## 2021-09-15 ASSESSMENT — ANXIETY QUESTIONNAIRES: GAD7 TOTAL SCORE: 6

## 2021-11-07 DIAGNOSIS — F41.1 GAD (GENERALIZED ANXIETY DISORDER): ICD-10-CM

## 2021-11-08 RX ORDER — ALPRAZOLAM 0.5 MG
0.5 TABLET ORAL 3 TIMES DAILY PRN
Qty: 30 TABLET | Refills: 0 | Status: SHIPPED | OUTPATIENT
Start: 2021-11-08 | End: 2022-02-14

## 2021-11-08 NOTE — TELEPHONE ENCOUNTER
pcp out of office  PDMP Review       Value Time User    State PDMP site checked  Yes 11/8/2021 10:05 AM Karis [facility] MD      utd on appt  Appears Rx is filled every 1-4 months.   Has been 3 months since last fill.   Will send refill in pcp absence.

## 2021-12-02 DIAGNOSIS — K29.70 GASTRITIS WITHOUT BLEEDING, UNSPECIFIED CHRONICITY, UNSPECIFIED GASTRITIS TYPE: ICD-10-CM

## 2021-12-02 DIAGNOSIS — K21.00 GASTROESOPHAGEAL REFLUX DISEASE WITH ESOPHAGITIS WITHOUT HEMORRHAGE: ICD-10-CM

## 2021-12-02 RX ORDER — PANTOPRAZOLE SODIUM 40 MG/1
40 TABLET, DELAYED RELEASE ORAL DAILY
Qty: 30 TABLET | Refills: 1 | Status: SHIPPED | OUTPATIENT
Start: 2021-12-02 | End: 2022-01-13

## 2021-12-02 NOTE — TELEPHONE ENCOUNTER
Prescription approved per [facility] Refill Protocol.  Mary Kay Merino RN, BSN   [facility]

## 2021-12-26 ENCOUNTER — HEALTH MAINTENANCE LETTER (OUTPATIENT)
Age: 51
End: 2021-12-26

## 2022-01-13 DIAGNOSIS — K21.00 GASTROESOPHAGEAL REFLUX DISEASE WITH ESOPHAGITIS WITHOUT HEMORRHAGE: ICD-10-CM

## 2022-01-13 DIAGNOSIS — K29.70 GASTRITIS WITHOUT BLEEDING, UNSPECIFIED CHRONICITY, UNSPECIFIED GASTRITIS TYPE: ICD-10-CM

## 2022-01-13 RX ORDER — PANTOPRAZOLE SODIUM 40 MG/1
40 TABLET, DELAYED RELEASE ORAL DAILY
Qty: 30 TABLET | Refills: 1 | Status: SHIPPED | OUTPATIENT
Start: 2022-01-13 | End: 2022-05-12

## 2022-02-14 DIAGNOSIS — F41.1 GAD (GENERALIZED ANXIETY DISORDER): ICD-10-CM

## 2022-02-14 RX ORDER — ALPRAZOLAM 0.5 MG
TABLET ORAL
Qty: 30 TABLET | Refills: 0 | Status: SHIPPED | OUTPATIENT
Start: 2022-02-14 | End: 2022-11-09

## 2022-04-26 DIAGNOSIS — F41.1 GAD (GENERALIZED ANXIETY DISORDER): ICD-10-CM

## 2022-04-28 RX ORDER — VENLAFAXINE HYDROCHLORIDE 75 MG/1
75 CAPSULE, EXTENDED RELEASE ORAL DAILY
Qty: 30 CAPSULE | Refills: 0 | Status: SHIPPED | OUTPATIENT
Start: 2022-04-28 | End: 2022-05-26

## 2022-04-28 NOTE — TELEPHONE ENCOUNTER
Refill sent.  Follow up needed for additional refills - message on script.   Appointment scheduled.  PSK

## 2022-05-12 ENCOUNTER — VIRTUAL VISIT (OUTPATIENT)
Dept: FAMILY MEDICINE | Facility: CLINIC | Age: 52
End: 2022-05-12
Payer: COMMERCIAL

## 2022-05-12 ENCOUNTER — TELEPHONE (OUTPATIENT)
Dept: FAMILY MEDICINE | Facility: CLINIC | Age: 52
End: 2022-05-12
Payer: COMMERCIAL

## 2022-05-12 DIAGNOSIS — M25.561 CHRONIC PAIN OF RIGHT KNEE: ICD-10-CM

## 2022-05-12 DIAGNOSIS — F51.01 PRIMARY INSOMNIA: ICD-10-CM

## 2022-05-12 DIAGNOSIS — L65.9 HAIR LOSS: ICD-10-CM

## 2022-05-12 DIAGNOSIS — Z13.1 SCREENING FOR DIABETES MELLITUS: ICD-10-CM

## 2022-05-12 DIAGNOSIS — M54.2 NECK PAIN: Primary | ICD-10-CM

## 2022-05-12 DIAGNOSIS — Z13.220 LIPID SCREENING: ICD-10-CM

## 2022-05-12 DIAGNOSIS — G89.29 CHRONIC PAIN OF RIGHT KNEE: ICD-10-CM

## 2022-05-12 DIAGNOSIS — F41.1 GAD (GENERALIZED ANXIETY DISORDER): ICD-10-CM

## 2022-05-12 DIAGNOSIS — K86.1 CHRONIC PANCREATITIS, UNSPECIFIED PANCREATITIS TYPE (H): ICD-10-CM

## 2022-05-12 PROCEDURE — 99215 OFFICE O/P EST HI 40 MIN: CPT | Mod: GT | Performed by: FAMILY MEDICINE

## 2022-05-12 RX ORDER — TRAZODONE HYDROCHLORIDE 50 MG/1
50 TABLET, FILM COATED ORAL AT BEDTIME
Qty: 90 TABLET | Refills: 3 | Status: SHIPPED | OUTPATIENT
Start: 2022-05-12 | End: 2023-05-09

## 2022-05-12 RX ORDER — TRETINOIN 0.5 MG/G
CREAM TOPICAL
COMMUNITY
Start: 2022-04-07 | End: 2023-07-27

## 2022-05-12 RX ORDER — VENLAFAXINE HYDROCHLORIDE 37.5 MG/1
37.5 CAPSULE, EXTENDED RELEASE ORAL DAILY
Qty: 90 CAPSULE | Refills: 0 | Status: SHIPPED | OUTPATIENT
Start: 2022-05-12 | End: 2022-12-12 | Stop reason: DRUGHIGH

## 2022-05-12 ASSESSMENT — ANXIETY QUESTIONNAIRES
6. BECOMING EASILY ANNOYED OR IRRITABLE: NOT AT ALL
2. NOT BEING ABLE TO STOP OR CONTROL WORRYING: SEVERAL DAYS
GAD7 TOTAL SCORE: 4
7. FEELING AFRAID AS IF SOMETHING AWFUL MIGHT HAPPEN: SEVERAL DAYS
5. BEING SO RESTLESS THAT IT IS HARD TO SIT STILL: NOT AT ALL
1. FEELING NERVOUS, ANXIOUS, OR ON EDGE: SEVERAL DAYS
GAD7 TOTAL SCORE: 5
6. BECOMING EASILY ANNOYED OR IRRITABLE: NOT AT ALL
8. IF YOU CHECKED OFF ANY PROBLEMS, HOW DIFFICULT HAVE THESE MADE IT FOR YOU TO DO YOUR WORK, TAKE CARE OF THINGS AT HOME, OR GET ALONG WITH OTHER PEOPLE?: SOMEWHAT DIFFICULT
IF YOU CHECKED OFF ANY PROBLEMS ON THIS QUESTIONNAIRE, HOW DIFFICULT HAVE THESE PROBLEMS MADE IT FOR YOU TO DO YOUR WORK, TAKE CARE OF THINGS AT HOME, OR GET ALONG WITH OTHER PEOPLE: NOT DIFFICULT AT ALL
7. FEELING AFRAID AS IF SOMETHING AWFUL MIGHT HAPPEN: SEVERAL DAYS
GAD7 TOTAL SCORE: 4
3. WORRYING TOO MUCH ABOUT DIFFERENT THINGS: SEVERAL DAYS
4. TROUBLE RELAXING: NOT AT ALL
7. FEELING AFRAID AS IF SOMETHING AWFUL MIGHT HAPPEN: SEVERAL DAYS
1. FEELING NERVOUS, ANXIOUS, OR ON EDGE: SEVERAL DAYS
GAD7 TOTAL SCORE: 4
5. BEING SO RESTLESS THAT IT IS HARD TO SIT STILL: NOT AT ALL
2. NOT BEING ABLE TO STOP OR CONTROL WORRYING: SEVERAL DAYS
3. WORRYING TOO MUCH ABOUT DIFFERENT THINGS: SEVERAL DAYS

## 2022-05-12 ASSESSMENT — PATIENT HEALTH QUESTIONNAIRE - PHQ9
5. POOR APPETITE OR OVEREATING: SEVERAL DAYS
SUM OF ALL RESPONSES TO PHQ QUESTIONS 1-9: 0

## 2022-05-12 NOTE — PROGRESS NOTES
Celina is a 51 year old who is being evaluated via a billable video visit.      How would you like to obtain your AVS? MyChart  If the video visit is dropped, the invitation should be resent by: Text to cell phone: [phone]  Will anyone else be joining your video visit? No    Video Start Time: 2:44 PM    Assessment & Plan     YUDI (generalized anxiety disorder)  Anxiety symptoms are well controlled.  She would like to try to wean from her venlafaxine to see if she can control anxiety symptoms with other means.  We discussed the potential rebound anxiety with decrease in medication and she will monitor for that.  She is not currently having any depression related issues.  Outlined for weaning of the venlafaxine was given and can be noted in the patient instruction section.  Gradual weaning is recommended and she will notify me if this is not tolerated and a new prescription for 75 mg dose can be sent if that occurs.  - venlafaxine (EFFEXOR XR) 37.5 MG 24 hr capsule; Take 1 capsule (37.5 mg) by mouth daily  - traZODone (DESYREL) 50 MG tablet; Take 1 tablet (50 mg) by mouth At Bedtime    Primary insomnia  Sleep is well controlled with low-dose trazodone without side effects from the medication.  We will continue that at this point.  - traZODone (DESYREL) 50 MG tablet; Take 1 tablet (50 mg) by mouth At Bedtime    Neck pain  Ongoing neck pain for which she is seeing physical therapy and chiropractic care.  X-ray is recommended with possible progression to MRI pending these results.  Her symptoms do not sound radicular with radiation down the arms but x-ray evaluation is planned.  - XR Cervical Spine 2/3 Views; Future    Chronic pain of right knee  Knee x-rays recommended.  She has been seeing physical therapy and chiropractic care for the neck and they have briefly addressed the need symptoms.  May need more formal physical therapy.  - XR Knee AP Standing Bilateral; Future    Screening for diabetes mellitus  Return  "to Clinic for fasting labs  - Comprehensive metabolic panel (BMP + Alb, Alk Phos, ALT, AST, Total. Bili, TP); Future    Lipid screening  Return to clinic for fasting lab  - Lipid panel reflex to direct LDL Fasting; Future    Hair loss  Additional evaluation for her symptoms of hair loss.  She reports her hairdresser is noting significant regrowth of hair.  Denies any recent illness or event associated with the symptoms.  - CBC with platelets; Future  - Comprehensive metabolic panel (BMP + Alb, Alk Phos, ALT, AST, Total. Bili, TP); Future  - TSH with free T4 reflex; Future    Chronic pancreatitis, unspecified pancreatitis type (H)  No recent flare of symptoms have been noted.  Continue monitor for recurrence.  No longer using pancreatic enzymes    Review of prior external note(s) from - Outside records from Gastroenterology  Ordering of each unique test  Prescription drug management       41 minutes spent on the date of the encounter doing chart review, review of outside records, review of test results, patient visit and documentation        BMI:   Estimated body mass index is 25.15 kg/m  as calculated from the following:    Height as of 9/13/21: 1.727 m (5' 8\").    Weight as of 9/13/21: 75 kg (165 lb 6.4 oz).   Weight management plan: Discussed healthy diet and exercise guidelines    Patient Instructions   For weaning the Effexor XR -  Change to 37.5 mg alternating with 75 mg dose every other day.   Continue that dose for 2-3 weeks.  Then decrease to 37.5 mg daily for 3 weeks then   37.5 mg every other day for 2-3 weeks th en every 3 rd day for 2-3 weeks then discontinue medication.      Use Xanax as needed.    For the neck pain that has been present since February, I would recommend an xray of the neck.  I will make additional recommendations when that result returns.    For the knee pain - xray recommended.      Labs recommended for evaluation of the hair loss and screening for diabetes, cholesterol, " etc.              Return in about 3 months (around 2022) for Physical Exam.    Lucero Gonzalez MD  [facility] TALIA Patrick is a 51 year old who presents for the following health issues  accompanied by her self.    History of Present Illness       Mental Health Follow-up:  Patient presents to follow-up on Anxiety.    Patient's anxiety since last visit has been:  Good  The patient is not having other symptoms associated with anxiety.  Any significant life events: health concerns  Patient is feeling anxious or having panic attacks.  Patient has no concerns about alcohol or drug use.         Today's YUDI-7 Score: 4She consumes 1 sweetened beverage(s) daily.She exercises with enough effort to increase her heart rate 30 to 60 minutes per day.  She exercises with enough effort to increase her heart rate 4 days per week.   She is taking medications regularly.        Depression and Anxiety Follow-Up    How are you doing with your depression since your last visit? No change    How are you doing with your anxiety since your last visit?  No change    Are you having other symptoms that might be associated with depression or anxiety? No    Have you had a significant life event? No     Do you have any concerns with your use of alcohol or other drugs? No  Taking the venlafaxine daily.  May need the xanax premenstrual but not very often.  Taking trazodone.    Social History     Tobacco Use     Smoking status: Former Smoker     Quit date: 2000     Years since quittin.3     Smokeless tobacco: Never Used   Substance Use Topics     Alcohol use: No     Drug use: No     PHQ 2020   PHQ-9 Total Score 3 3 0   Q9: Thoughts of better off dead/self-harm past 2 weeks Not at all Not at all Not at all     YUDI-7 SCORE 2021   Total Score - - 4 (minimal anxiety)   Total Score 6 5 4       YUDI-7  2022   1. Feeling nervous, anxious, or on edge 1    2. Not being able to stop or control worrying 1   3. Worrying too much about different things 1   4. Trouble relaxing 0   5. Being so restless that it is hard to sit still 0   6. Becoming easily annoyed or irritable 0   7. Feeling afraid, as if something awful might happen 1   YUDI-7 Total Score 4   If you checked any problems, how difficult have they made it for you to do your work, take care of things at home, or get along with other people? -       Suicide Assessment Five-step Evaluation and Treatment (SAFE-T)      How many servings of fruits and vegetables do you eat daily?  4 or more    On average, how many sweetened beverages do you drink each day (Examples: soda, juice, sweet tea, etc.  Do NOT count diet or artificially sweetened beverages)?   1    How many days per week do you exercise enough to make your heart beat faster? 4    How many minutes a day do you exercise enough to make your heart beat faster? 30 - 60    How many days per week do you miss taking your medication? 0    Last menses 2 weeks ago.  Hair shedding - lot with peak of period day.  No itching of scalp.  Has topical   No illness in last 6-12 weeks.  Mild illness.        Neck pain - knee pain.  Seeing PHYSICAL THERAPY and chiropractor.  No xray done.  No radiation of pain down arms.  No weakness, numbness, tingling.  Neck on right side to right shoulder blade area.  Home exercise, manual PHYSICAL THERAPY - since February.      Knee pain - right -  Unable to kneel, bending hurts, walking ok.  ?swelling.  Hip issues - within last 5 years MRI - torn labrum.  Symptoms resolved.  Less activity in last year - weakness glutes.    History of chronic pancreatitis -no recent abdominal symptoms are described.  Reports that she was hospitalized/seen in the emergency department in [place] over the winter with abdominal symptoms but she attributes this to anxiety.        Review of Systems   Constitutional, HEENT, cardiovascular, pulmonary, gi and gu  systems are negative, except as otherwise noted.      Objective           Vitals:  No vitals were obtained today due to virtual visit.    Physical Exam   GENERAL: Healthy, alert and no distress  EYES: Eyes grossly normal to inspection.  No discharge or erythema, or obvious scleral/conjunctival abnormalities.  RESP: No audible wheeze, cough, or visible cyanosis.  No visible retractions or increased work of breathing.    SKIN: Visible skin clear. No significant rash, abnormal pigmentation or lesions.  NEURO: Cranial nerves grossly intact.  Mentation and speech appropriate for age.  PSYCH: Mentation appears normal, affect normal/bright, judgement and insight intact, normal speech and appearance well-groomed.                Video-Visit Details    Type of service:  Video Visit    Video End Time:3:20 PM    Originating Location (pt. Location): Home    Distant Location (provider location):  [facility]     Platform used for Video Visit: [employer]       This chart was documented by provider using a voice activated software called Dragon in addition to manual typing. There may be vocabulary errors or other grammatical errors due to this.

## 2022-05-12 NOTE — PATIENT INSTRUCTIONS
For weaning the Effexor XR -  Change to 37.5 mg alternating with 75 mg dose every other day.   Continue that dose for 2-3 weeks.  Then decrease to 37.5 mg daily for 3 weeks then   37.5 mg every other day for 2-3 weeks th en every 3 rd day for 2-3 weeks then discontinue medication.      Use Xanax as needed.    For the neck pain that has been present since February, I would recommend an xray of the neck.  I will make additional recommendations when that result returns.    For the knee pain - xray recommended.      Labs recommended for evaluation of the hair loss and screening for diabetes, cholesterol, etc.

## 2022-05-13 PROBLEM — K86.1 CHRONIC PANCREATITIS, UNSPECIFIED PANCREATITIS TYPE (H): Status: ACTIVE | Noted: 2022-05-13

## 2022-05-13 ASSESSMENT — ANXIETY QUESTIONNAIRES: GAD7 TOTAL SCORE: 4

## 2022-05-17 ENCOUNTER — ANCILLARY PROCEDURE (OUTPATIENT)
Dept: GENERAL RADIOLOGY | Facility: CLINIC | Age: 52
End: 2022-05-17
Attending: FAMILY MEDICINE
Payer: COMMERCIAL

## 2022-05-17 ENCOUNTER — ANCILLARY PROCEDURE (OUTPATIENT)
Dept: MAMMOGRAPHY | Facility: CLINIC | Age: 52
End: 2022-05-17
Attending: FAMILY MEDICINE
Payer: COMMERCIAL

## 2022-05-17 DIAGNOSIS — G89.29 CHRONIC PAIN OF RIGHT KNEE: ICD-10-CM

## 2022-05-17 DIAGNOSIS — M25.561 CHRONIC PAIN OF RIGHT KNEE: ICD-10-CM

## 2022-05-17 DIAGNOSIS — Z12.31 VISIT FOR SCREENING MAMMOGRAM: ICD-10-CM

## 2022-05-17 PROCEDURE — 77063 BREAST TOMOSYNTHESIS BI: CPT | Mod: GC | Performed by: STUDENT IN AN ORGANIZED HEALTH CARE EDUCATION/TRAINING PROGRAM

## 2022-05-17 PROCEDURE — 73565 X-RAY EXAM OF KNEES: CPT | Performed by: RADIOLOGY

## 2022-05-17 PROCEDURE — 77067 SCR MAMMO BI INCL CAD: CPT | Mod: GC | Performed by: STUDENT IN AN ORGANIZED HEALTH CARE EDUCATION/TRAINING PROGRAM

## 2022-05-18 ENCOUNTER — MYC MEDICAL ADVICE (OUTPATIENT)
Dept: FAMILY MEDICINE | Facility: CLINIC | Age: 52
End: 2022-05-18

## 2022-05-18 ENCOUNTER — LAB (OUTPATIENT)
Dept: LAB | Facility: CLINIC | Age: 52
End: 2022-05-18
Payer: COMMERCIAL

## 2022-05-18 DIAGNOSIS — M54.2 NECK PAIN: Primary | ICD-10-CM

## 2022-05-18 DIAGNOSIS — M54.6 CHRONIC MIDLINE THORACIC BACK PAIN: ICD-10-CM

## 2022-05-18 DIAGNOSIS — L65.9 HAIR LOSS: ICD-10-CM

## 2022-05-18 DIAGNOSIS — Z13.1 SCREENING FOR DIABETES MELLITUS: ICD-10-CM

## 2022-05-18 DIAGNOSIS — Z13.220 LIPID SCREENING: ICD-10-CM

## 2022-05-18 DIAGNOSIS — G89.29 CHRONIC MIDLINE THORACIC BACK PAIN: ICD-10-CM

## 2022-05-18 DIAGNOSIS — L65.9 HAIR LOSS: Primary | ICD-10-CM

## 2022-05-18 DIAGNOSIS — M50.30 DDD (DEGENERATIVE DISC DISEASE), CERVICAL: ICD-10-CM

## 2022-05-18 LAB
ERYTHROCYTE [DISTWIDTH] IN BLOOD BY AUTOMATED COUNT: 11.7 % (ref 10–15)
HCT VFR BLD AUTO: 43.6 % (ref 35–47)
HGB BLD-MCNC: 14.2 G/DL (ref 11.7–15.7)
MCH RBC QN AUTO: 31.8 PG (ref 26.5–33)
MCHC RBC AUTO-ENTMCNC: 32.6 G/DL (ref 31.5–36.5)
MCV RBC AUTO: 98 FL (ref 78–100)
PLATELET # BLD AUTO: 295 10E3/UL (ref 150–450)
RBC # BLD AUTO: 4.46 10E6/UL (ref 3.8–5.2)
WBC # BLD AUTO: 11.8 10E3/UL (ref 4–11)

## 2022-05-18 PROCEDURE — 84443 ASSAY THYROID STIM HORMONE: CPT

## 2022-05-18 PROCEDURE — 36416 COLLJ CAPILLARY BLOOD SPEC: CPT

## 2022-05-18 PROCEDURE — 80061 LIPID PANEL: CPT

## 2022-05-18 PROCEDURE — 85027 COMPLETE CBC AUTOMATED: CPT

## 2022-05-18 PROCEDURE — 36415 COLL VENOUS BLD VENIPUNCTURE: CPT

## 2022-05-18 PROCEDURE — 80053 COMPREHEN METABOLIC PANEL: CPT

## 2022-05-19 LAB
ALBUMIN SERPL-MCNC: 4.1 G/DL (ref 3.4–5)
ALP SERPL-CCNC: 87 U/L (ref 40–150)
ALT SERPL W P-5'-P-CCNC: 26 U/L (ref 0–50)
ANION GAP SERPL CALCULATED.3IONS-SCNC: 7 MMOL/L (ref 3–14)
AST SERPL W P-5'-P-CCNC: 28 U/L (ref 0–45)
BILIRUB SERPL-MCNC: 0.3 MG/DL (ref 0.2–1.3)
BUN SERPL-MCNC: 17 MG/DL (ref 7–30)
CALCIUM SERPL-MCNC: 9 MG/DL (ref 8.5–10.1)
CHLORIDE BLD-SCNC: 108 MMOL/L (ref 94–109)
CHOLEST SERPL-MCNC: 150 MG/DL
CO2 SERPL-SCNC: 22 MMOL/L (ref 20–32)
CREAT SERPL-MCNC: 0.75 MG/DL (ref 0.52–1.04)
FASTING STATUS PATIENT QL REPORTED: YES
GFR SERPL CREATININE-BSD FRML MDRD: >90 ML/MIN/1.73M2
GLUCOSE BLD-MCNC: 110 MG/DL (ref 70–99)
HDLC SERPL-MCNC: 45 MG/DL
LDLC SERPL CALC-MCNC: 86 MG/DL
NONHDLC SERPL-MCNC: 105 MG/DL
POTASSIUM BLD-SCNC: 4.3 MMOL/L (ref 3.4–5.3)
PROT SERPL-MCNC: 7 G/DL (ref 6.8–8.8)
SODIUM SERPL-SCNC: 137 MMOL/L (ref 133–144)
TRIGL SERPL-MCNC: 95 MG/DL
TSH SERPL DL<=0.005 MIU/L-ACNC: 1.49 MU/L (ref 0.4–4)

## 2022-05-23 ENCOUNTER — MYC MEDICAL ADVICE (OUTPATIENT)
Dept: FAMILY MEDICINE | Facility: CLINIC | Age: 52
End: 2022-05-23
Payer: COMMERCIAL

## 2022-05-24 ENCOUNTER — MYC MEDICAL ADVICE (OUTPATIENT)
Dept: FAMILY MEDICINE | Facility: CLINIC | Age: 52
End: 2022-05-24
Payer: COMMERCIAL

## 2022-05-24 DIAGNOSIS — D72.829 LEUKOCYTOSIS, UNSPECIFIED TYPE: ICD-10-CM

## 2022-05-24 DIAGNOSIS — F41.1 GAD (GENERALIZED ANXIETY DISORDER): ICD-10-CM

## 2022-05-24 DIAGNOSIS — R73.9 HYPERGLYCEMIA: Primary | ICD-10-CM

## 2022-05-26 RX ORDER — VENLAFAXINE HYDROCHLORIDE 75 MG/1
75 CAPSULE, EXTENDED RELEASE ORAL DAILY
Qty: 90 CAPSULE | Refills: 1 | Status: SHIPPED | OUTPATIENT
Start: 2022-05-26 | End: 2022-11-09

## 2022-05-26 NOTE — TELEPHONE ENCOUNTER
Called and spoke to patient and verified the dose of Venlafaxine that she is currently taking. She states that at her recent appointment with Dr. Gonzalez she had discussed decreasing the dose to 37.5. However, after some consideration she has decided that she wants to stay on the 75mg. Advised that I would send in the new prescription for her and if she would like to come down in the future she has the venlafaxine 37.5 mg on hand.  Patient in agreement with plan.    Shahida Moran RN [facility]

## 2022-06-22 ENCOUNTER — ANCILLARY PROCEDURE (OUTPATIENT)
Dept: MRI IMAGING | Facility: CLINIC | Age: 52
End: 2022-06-22
Attending: FAMILY MEDICINE
Payer: COMMERCIAL

## 2022-06-22 DIAGNOSIS — M50.30 DDD (DEGENERATIVE DISC DISEASE), CERVICAL: ICD-10-CM

## 2022-06-22 DIAGNOSIS — M54.2 NECK PAIN: ICD-10-CM

## 2022-06-22 PROCEDURE — 72141 MRI NECK SPINE W/O DYE: CPT | Performed by: RADIOLOGY

## 2022-08-03 ENCOUNTER — MYC MEDICAL ADVICE (OUTPATIENT)
Dept: FAMILY MEDICINE | Facility: CLINIC | Age: 52
End: 2022-08-03

## 2022-08-03 DIAGNOSIS — M48.02 NEURAL FORAMINAL STENOSIS OF CERVICAL SPINE: Primary | ICD-10-CM

## 2022-08-03 DIAGNOSIS — M54.2 NECK PAIN: ICD-10-CM

## 2022-08-19 DIAGNOSIS — F41.1 GAD (GENERALIZED ANXIETY DISORDER): ICD-10-CM

## 2022-08-21 RX ORDER — VENLAFAXINE HYDROCHLORIDE 37.5 MG/1
CAPSULE, EXTENDED RELEASE ORAL
Qty: 90 CAPSULE | Refills: 0 | OUTPATIENT
Start: 2022-08-21

## 2022-10-23 ENCOUNTER — HEALTH MAINTENANCE LETTER (OUTPATIENT)
Age: 52
End: 2022-10-23

## 2022-11-30 ENCOUNTER — MYC REFILL (OUTPATIENT)
Dept: FAMILY MEDICINE | Facility: CLINIC | Age: 52
End: 2022-11-30

## 2022-11-30 DIAGNOSIS — F41.1 GAD (GENERALIZED ANXIETY DISORDER): ICD-10-CM

## 2022-12-01 RX ORDER — VENLAFAXINE HYDROCHLORIDE 75 MG/1
75 CAPSULE, EXTENDED RELEASE ORAL DAILY
Qty: 90 CAPSULE | Refills: 0 | Status: SHIPPED | OUTPATIENT
Start: 2022-12-01 | End: 2022-12-12

## 2022-12-12 ENCOUNTER — VIRTUAL VISIT (OUTPATIENT)
Dept: FAMILY MEDICINE | Facility: CLINIC | Age: 52
End: 2022-12-12
Payer: COMMERCIAL

## 2022-12-12 DIAGNOSIS — Z12.11 SCREEN FOR COLON CANCER: ICD-10-CM

## 2022-12-12 DIAGNOSIS — F41.1 GAD (GENERALIZED ANXIETY DISORDER): ICD-10-CM

## 2022-12-12 PROCEDURE — 99213 OFFICE O/P EST LOW 20 MIN: CPT | Mod: GT | Performed by: FAMILY MEDICINE

## 2022-12-12 RX ORDER — VENLAFAXINE HYDROCHLORIDE 75 MG/1
75 CAPSULE, EXTENDED RELEASE ORAL DAILY
Qty: 90 CAPSULE | Refills: 0 | Status: SHIPPED | OUTPATIENT
Start: 2022-12-12 | End: 2023-05-09

## 2022-12-12 ASSESSMENT — ANXIETY QUESTIONNAIRES
4. TROUBLE RELAXING: SEVERAL DAYS
7. FEELING AFRAID AS IF SOMETHING AWFUL MIGHT HAPPEN: NOT AT ALL
GAD7 TOTAL SCORE: 4
8. IF YOU CHECKED OFF ANY PROBLEMS, HOW DIFFICULT HAVE THESE MADE IT FOR YOU TO DO YOUR WORK, TAKE CARE OF THINGS AT HOME, OR GET ALONG WITH OTHER PEOPLE?: NOT DIFFICULT AT ALL
IF YOU CHECKED OFF ANY PROBLEMS ON THIS QUESTIONNAIRE, HOW DIFFICULT HAVE THESE PROBLEMS MADE IT FOR YOU TO DO YOUR WORK, TAKE CARE OF THINGS AT HOME, OR GET ALONG WITH OTHER PEOPLE: NOT DIFFICULT AT ALL
GAD7 TOTAL SCORE: 4
7. FEELING AFRAID AS IF SOMETHING AWFUL MIGHT HAPPEN: NOT AT ALL
3. WORRYING TOO MUCH ABOUT DIFFERENT THINGS: SEVERAL DAYS
1. FEELING NERVOUS, ANXIOUS, OR ON EDGE: SEVERAL DAYS
GAD7 TOTAL SCORE: 4
5. BEING SO RESTLESS THAT IT IS HARD TO SIT STILL: NOT AT ALL
2. NOT BEING ABLE TO STOP OR CONTROL WORRYING: SEVERAL DAYS
6. BECOMING EASILY ANNOYED OR IRRITABLE: NOT AT ALL

## 2022-12-12 NOTE — PROGRESS NOTES
Celina is a 52 year old who is being evaluated via a billable video visit.      How would you like to obtain your AVS? MyChart  If the video visit is dropped, the invitation should be resent by: Text to cell phone: [phone]  Will anyone else be joining your video visit? No          Assessment & Plan     YUDI (generalized anxiety disorder)  Symptoms are well controlled with current treatment.  Additional refills given.  She will follow-up for physical in the spring when she returns from [place].  - venlafaxine (EFFEXOR XR) 75 MG 24 hr capsule; Take 1 capsule (75 mg) by mouth daily    Screen for colon cancer  Past due for colon cancer screening.  Discussed various screening options.  She is willing to undergo colonoscopy and we will schedule that for when she returns  - Colonoscopy Screening  Referral; Future             Patient Instructions   Continue current venlafaxine dose.  Trazodone at night recommended.  As needed xanax to continue.    Referral for colonoscopy for when you return from [place].  You are due for annual exam and PAP smear  - you can schedule for when you return.          Return in about 3 months (around 3/12/2023) for Physical Exam.    Lucero Gonzalez MD  [facility]   Celina is a 52 year old presenting for the following health issues:  Anxiety    Patient states that her anxiety is worse around her period    History of Present Illness       Mental Health Follow-up:  Patient presents to follow-up on Anxiety.    Patient's anxiety since last visit has been:  Good  The patient is not having other symptoms associated with anxiety.  Any significant life events: No  Patient is feeling anxious or having panic attacks.  Patient has no concerns about alcohol or drug use.    She eats 2-3 servings of fruits and vegetables daily.She consumes 0 sweetened beverage(s) daily.She exercises with enough effort to increase her heart rate 60 or more minutes per day.   She exercises with enough effort to increase her heart rate 3 or less days per week.   She is taking medications regularly.  Today's YUDI-7 Score: 4        Anxiety Follow-Up    How are you doing with your depression since your last visit? No change    How are you doing with your anxiety since your last visit?  No change    Are you having other symptoms that might be associated with depression or anxiety? No    Have you had a significant life event? No     Do you have any concerns with your use of alcohol or other drugs? No  Taking xanax 1-2 times per week - worse with PMS time.    Trazodone working well - keeps asleep, no daytime carryover.    Social History     Tobacco Use     Smoking status: Former     Types: Cigarettes     Quit date: 2000     Years since quittin.9     Passive exposure: Never     Smokeless tobacco: Never   Vaping Use     Vaping Use: Never used   Substance Use Topics     Alcohol use: No     Drug use: No     PHQ 2020   PHQ-9 Total Score 3 3 0   Q9: Thoughts of better off dead/self-harm past 2 weeks Not at all Not at all Not at all     YUDI-7 SCORE 2022   Total Score - 4 (minimal anxiety) 4 (minimal anxiety)   Total Score 5 4 4         Suicide Assessment Five-step Evaluation and Treatment (SAFE-T)        Review of Systems   Constitutional, HEENT, cardiovascular, pulmonary, gi and gu systems are negative, except as otherwise noted.      Objective    Vitals - Patient Reported  Pain Score: No Pain (0)      Vitals:  No vitals were obtained today due to virtual visit.    Physical Exam   GENERAL: Healthy, alert and no distress  EYES: Eyes grossly normal to inspection.  No discharge or erythema, or obvious scleral/conjunctival abnormalities.  RESP: No audible wheeze, cough, or visible cyanosis.  No visible retractions or increased work of breathing.    SKIN: Visible skin clear. No significant rash, abnormal pigmentation or lesions.  NEURO: Cranial  nerves grossly intact.  Mentation and speech appropriate for age.  PSYCH: Mentation appears normal, affect normal/bright, judgement and insight intact, normal speech and appearance well-groomed.                Video-Visit Details    Video Start Time: 5:19 PM    Type of service:  Video Visit    Video End Time:5:31 PM    Originating Location (pt. Location): Home    Distant Location (provider location):  On-site    Platform used for Video Visit: [employer]

## 2022-12-12 NOTE — PATIENT INSTRUCTIONS
Continue current venlafaxine dose.  Trazodone at night recommended.  As needed xanax to continue.    Referral for colonoscopy for when you return from trip.  You are due for annual exam and PAP smear  - you can schedule for when you return.

## 2022-12-26 ENCOUNTER — E-VISIT (OUTPATIENT)
Dept: URGENT CARE | Facility: CLINIC | Age: 52
End: 2022-12-26
Payer: COMMERCIAL

## 2022-12-26 DIAGNOSIS — R30.0 DYSURIA: Primary | ICD-10-CM

## 2022-12-26 PROCEDURE — 99207 PR NON-BILLABLE SERV PER CHARTING: CPT | Performed by: FAMILY MEDICINE

## 2022-12-26 NOTE — PATIENT INSTRUCTIONS
Dear Celina Ledesma,    We are sorry you are not feeling well. Based on the responses you provided, it is recommended that you be seen in-person in urgent care so we can better evaluate your symptoms. Please click here to find the nearest urgent care location to you.   You will not be charged for this Visit. Thank you for trusting us with your care.    Glendy Mei MD

## 2023-02-09 ENCOUNTER — TELEPHONE (OUTPATIENT)
Dept: GASTROENTEROLOGY | Facility: CLINIC | Age: 53
End: 2023-02-09
Payer: COMMERCIAL

## 2023-02-09 NOTE — TELEPHONE ENCOUNTER
"Screening Questions  BLUE  KIND OF PREP RED  LOCATION [review exclusion criteria] GREEN  SEDATION TYPE        Y Are you active on mychart?       ESTER Ordering/Referring Provider?        HP What type of coverage do you have?      N Have you had a positive covid test in the last 14 days?     25.8 1. BMI  [BMI 40+ - review exclusion criteria]    Y  2. Are you able to give consent for your medical care? [IF NO,RN REVIEW]          N  3. Are you taking any prescription pain medications on a routine schedule   (ex narcotics: tramadol, oxycodone, roxicodone, oxycontin,  and percocet)?          3a. EXTENDED PREP What kind of prescription?     N 4. Do you have any chemical dependencies such as alcohol, street drugs, or methadone?        **If yes 3- 5 , please schedule with MAC sedation.**          IF YES TO ANY 6 - 10 - HOSPITAL SETTING ONLY.     N 6.   Do you need assistance transferring?     N 7.   Have you had a heart or lung transplant?    N 8.   Are you currently on dialysis?   N 9.   Do you use daily home oxygen?   N 10. Do you take nitroglycerin?   10a.  If yes, how often?     11. [FEMALES]   Are you currently pregnant?    11a.  If yes, how many weeks? [ Greater than 12 weeks, OR NEEDED]    N 12. Do you have Pulmonary Hypertension? *NEED PAC APPT AT [facility]*     N 13. [review exclusion criteria]  Do you have any implantable devices in your body (pacemaker, defib, LVAD)?    N 14. In the past 6 months, have you had any heart related issues including cardiomyopathy or heart attack?     14a.  If yes, did it require cardiac stenting if so when?     N 15. Have you had a stroke or Transient ischemic attack (TIA - aka  mini stroke ) within 6 months?      N 16. Do you have mod to severe Obstructive Sleep Apnea?  [Hospital only]    N 17. Do you have SEVERE AND UNCONTROLLED asthma? *NEED PAC APPT AT [facility]*     N 18. Are you currently taking any blood thinners?     18a. If yes, inform patient to \"follow up w/ ordering provider for " "bridging instructions.\"    N 19. Do you take the medication Phentermine?    19a. If yes, \"Hold for 7 days before procedure.  Please consult your prescribing provider if you have questions about holding this medication.\"     N  20. Do you have chronic kidney disease?      N  21. Do you have a diagnosis of diabetes?     N  22. On a regular basis do you go 3-5 days between bowel movements?      23. Preferred LOCAL Pharmacy for Pre Prescription    [ LIST ONLY ONE PHARMACY]     [facility]/PHARMACY #[id number] - [place], [place] - [address] [place] [address] [address]    - CLOSING REMINDERS -    Informed patient they will need an adult    Cannot take any type of public or medical transportation alone    Conscious Sedation- Needs  for 6 hours after the procedure       MAC/General-Needs  for 24 hours after procedure    Pre-Procedure Covid test to be completed [[facility] PCR Testing Required]    Confirmed Nurse will call to complete assessment       - SCHEDULING DETAILS -  NO Hospital Setting Required? If yes, what is the exclusion?:    ANITRA  Surgeon    4/5  Date of Procedure  Lower Endoscopy [Colonoscopy]  Type of Procedure Scheduled  [facility]-If you answer yes to questions #8, #20, #21Which Colonoscopy Prep was Sent?     CS Sedation Type     N PAC / Pre-op Required                 "

## 2023-03-21 ENCOUNTER — TELEPHONE (OUTPATIENT)
Dept: GASTROENTEROLOGY | Facility: CLINIC | Age: 53
End: 2023-03-21

## 2023-03-21 DIAGNOSIS — Z12.11 SCREEN FOR COLON CANCER: Primary | ICD-10-CM

## 2023-03-21 RX ORDER — BISACODYL 5 MG/1
TABLET, DELAYED RELEASE ORAL
Qty: 4 TABLET | Refills: 0 | Status: SHIPPED | OUTPATIENT
Start: 2023-03-21 | End: 2023-05-08

## 2023-03-21 NOTE — TELEPHONE ENCOUNTER
Attempted to contact patient regarding upcoming Colonoscopy  procedure on 04.05.2023 for pre assessment questions. No answer.     Left message to return call to [phone] #4    Discuss Covid policy and designated  policy.    Pre op exam? N/A    Arrival time: 0745. Procedure time: 0830    Facility location: [facility]; [address], [place] [address]    Sedation type: Conscious sedation     Anticoagulants: No    Electronic implanted devices? No    Diabetic? No    Indication for procedure: screening colonoscopy    Bowel prep recommendation: Standard Golytely      Prep instructions sent via Catarizm. Bowel prep script sent to [facility]/PHARMACY #[id number] - [place] - [address]      Gisella Moore RN  Endoscopy Procedure Pre Assessment RN

## 2023-03-22 ENCOUNTER — TELEPHONE (OUTPATIENT)
Dept: FAMILY MEDICINE | Facility: CLINIC | Age: 53
End: 2023-03-22
Payer: COMMERCIAL

## 2023-03-22 DIAGNOSIS — F41.1 GAD (GENERALIZED ANXIETY DISORDER): ICD-10-CM

## 2023-03-22 RX ORDER — ALPRAZOLAM 0.5 MG
0.5 TABLET ORAL 3 TIMES DAILY PRN
Qty: 10 TABLET | Refills: 0 | Status: SHIPPED | OUTPATIENT
Start: 2023-03-22 | End: 2023-07-27

## 2023-03-22 NOTE — TELEPHONE ENCOUNTER
[facility] reviewed and no fills outside of this office.   lateral filled 11/9/22  Med refilled -however assist with scheduling physical with Dr. Gonzalez - was due in Spring for physical

## 2023-03-22 NOTE — TELEPHONE ENCOUNTER
Pending Prescriptions:                       Disp   Refills    ALPRAZolam (XANAX) 0.5 MG tablet          10 tab*0            Sig: Take 1 tablet (0.5 mg) by mouth 3 times daily as           needed for anxiety

## 2023-03-28 NOTE — TELEPHONE ENCOUNTER
Attempted to reach the patient to complete pre-assessment. Second attempt. No answer. Left message. ZoomForthhart message sent.     Sabra Escudero RN   Endoscopy Procedure Pre Assessment RN

## 2023-03-28 NOTE — TELEPHONE ENCOUNTER
Pre assessment questions completed for upcoming Colonoscopy  procedure scheduled on 4.5.23    COVID policy reviewed.     Reviewed procedural arrival time 0745 and facility location [facility]; [address], [place] [address]    Designated  policy reviewed. Instructed to have someone stay 6 hours post procedure.     Anticoagulation/blood thinners? No    Electronic implanted devices? No    Diabetic? No    Reviewed standard goprocedure prep instructions.     Patient verbalized understanding and had no questions or concerns at this time.    Ingrid Guevara RN  Endoscopy Procedure Pre Assessment RN

## 2023-04-02 ENCOUNTER — HEALTH MAINTENANCE LETTER (OUTPATIENT)
Age: 53
End: 2023-04-02

## 2023-04-05 ENCOUNTER — HOSPITAL ENCOUNTER (OUTPATIENT)
Facility: AMBULATORY SURGERY CENTER | Age: 53
Discharge: HOME | End: 2023-04-05
Attending: STUDENT IN AN ORGANIZED HEALTH CARE EDUCATION/TRAINING PROGRAM | Admitting: STUDENT IN AN ORGANIZED HEALTH CARE EDUCATION/TRAINING PROGRAM
Payer: COMMERCIAL

## 2023-04-05 VITALS
SYSTOLIC BLOOD PRESSURE: 124 MMHG | TEMPERATURE: 97.6 F | OXYGEN SATURATION: 99 % | DIASTOLIC BLOOD PRESSURE: 83 MMHG | RESPIRATION RATE: 16 BRPM | HEART RATE: 82 BPM

## 2023-04-05 DIAGNOSIS — Z12.11 COLON CANCER SCREENING: Primary | ICD-10-CM

## 2023-04-05 LAB — COLONOSCOPY: NORMAL

## 2023-04-05 PROCEDURE — G8918 PT W/O PREOP ORDER IV AB PRO: HCPCS

## 2023-04-05 PROCEDURE — 45378 DIAGNOSTIC COLONOSCOPY: CPT

## 2023-04-05 PROCEDURE — G8907 PT DOC NO EVENTS ON DISCHARG: HCPCS

## 2023-04-05 RX ORDER — LIDOCAINE 40 MG/G
CREAM TOPICAL
Status: DISCONTINUED | OUTPATIENT
Start: 2023-04-05 | End: 2023-04-06 | Stop reason: HOSPADM

## 2023-04-05 RX ORDER — ONDANSETRON 2 MG/ML
4 INJECTION INTRAMUSCULAR; INTRAVENOUS
Status: DISCONTINUED | OUTPATIENT
Start: 2023-04-05 | End: 2023-04-06 | Stop reason: HOSPADM

## 2023-04-05 RX ORDER — FENTANYL CITRATE 50 UG/ML
INJECTION, SOLUTION INTRAMUSCULAR; INTRAVENOUS PRN
Status: DISCONTINUED | OUTPATIENT
Start: 2023-04-05 | End: 2023-04-05 | Stop reason: HOSPADM

## 2023-04-05 NOTE — H&P
Pre-Endoscopy History and Physical     Celina Ledesma MRN# [id number]   YOB: 1970 Age: 52 year old     Date of Procedure: 2023  Primary care provider: Lucero Gonzalez  Type of Endoscopy: colonoscopy  Reason for Procedure: screening  Type of Anesthesia Anticipated: Moderate Sedation    HPI:    Celina is a 52 year old female who will be undergoing the above procedure.      A history and physical has been performed. The patient's medications and allergies have been reviewed. The risks and benefits of the procedure and the sedation options and risks were discussed with the patient.  I specifically discussed about possible sedation medication reaction, bleeding, and one of the more rare complications of perforation.  All questions were answered and informed consent was obtained.      She denies a personal or family history of anesthesia complications or bleeding disorders.     Allergies   Allergen Reactions     Macrobid [Nitrofurantoin]      Cough chills, abdominal pain     No Clinical Screening - See Comments Itching     Morphine Other (See Comments)     Risk for impact on the sphincter chloé so told not to take it.  problem with the sphincter of ode  itchy face, PN: effects chronic pancreatitis       Seasonal Allergies         Cannot display prior to admission medications because the patient has not been admitted in this contact.       Patient Active Problem List   Diagnosis     YUDI (generalized anxiety disorder)     Endometrial polyp     Lipoma of skin and subcutaneous tissue     Chronic pancreatitis, unspecified pancreatitis type (H)        History reviewed. No pertinent past medical history.     History reviewed. No pertinent surgical history.    Social History     Tobacco Use     Smoking status: Former     Types: Cigarettes     Quit date: 2000     Years since quittin.2     Passive exposure: Never     Smokeless tobacco: Never   Vaping Use     Vaping status: Never Used   Substance  "Use Topics     Alcohol use: No       Family History   Problem Relation Age of Onset     Cerebrovascular Disease Father      Diabetes Father      Heart Disease Maternal Grandmother      Heart Disease Maternal Grandfather        REVIEW OF SYSTEMS:     5 point ROS negative except as noted above in HPI, including Gen., Resp., CV, GI &  system review.      PHYSICAL EXAM:   BP (!) 123/96   Temp 98.3  F (36.8  C) (Temporal)   Resp 18   SpO2 99%  Estimated body mass index is 25.15 kg/m  as calculated from the following:    Height as of 9/13/21: 1.727 m (5' 8\").    Weight as of 9/13/21: 75 kg (165 lb 6.4 oz).   GENERAL APPEARANCE: healthy, alert and no distress  MENTAL STATUS: alert  AIRWAY EXAM: Mallampatti Class I (visualization of the soft palate, fauces, uvula, anterior and posterior pillars)  RESP: lungs clear to auscultation - no rales, rhonchi or wheezes  CV: regular rates and rhythm, normal S1 S2, no S3 or S4, no murmur, click or rub and no irregular beats      DIAGNOSTICS:    Not indicated      IMPRESSION   ASA Class 1 - Healthy patient, no medical problems        PLAN:       Plan for colonoscopy. We discussed the risks, benefits and alternatives and the patient wished to proceed.    The above has been forwarded to the consulting provider.      Signed Electronically by: SUN AYOUB MD  April 5, 2023    "

## 2023-05-07 ASSESSMENT — ANXIETY QUESTIONNAIRES
5. BEING SO RESTLESS THAT IT IS HARD TO SIT STILL: NOT AT ALL
3. WORRYING TOO MUCH ABOUT DIFFERENT THINGS: SEVERAL DAYS
GAD7 TOTAL SCORE: 6
7. FEELING AFRAID AS IF SOMETHING AWFUL MIGHT HAPPEN: SEVERAL DAYS
6. BECOMING EASILY ANNOYED OR IRRITABLE: SEVERAL DAYS
4. TROUBLE RELAXING: SEVERAL DAYS
GAD7 TOTAL SCORE: 6
IF YOU CHECKED OFF ANY PROBLEMS ON THIS QUESTIONNAIRE, HOW DIFFICULT HAVE THESE PROBLEMS MADE IT FOR YOU TO DO YOUR WORK, TAKE CARE OF THINGS AT HOME, OR GET ALONG WITH OTHER PEOPLE: SOMEWHAT DIFFICULT
8. IF YOU CHECKED OFF ANY PROBLEMS, HOW DIFFICULT HAVE THESE MADE IT FOR YOU TO DO YOUR WORK, TAKE CARE OF THINGS AT HOME, OR GET ALONG WITH OTHER PEOPLE?: SOMEWHAT DIFFICULT
2. NOT BEING ABLE TO STOP OR CONTROL WORRYING: SEVERAL DAYS
7. FEELING AFRAID AS IF SOMETHING AWFUL MIGHT HAPPEN: SEVERAL DAYS
1. FEELING NERVOUS, ANXIOUS, OR ON EDGE: SEVERAL DAYS
GAD7 TOTAL SCORE: 6

## 2023-05-08 ENCOUNTER — OFFICE VISIT (OUTPATIENT)
Dept: FAMILY MEDICINE | Facility: CLINIC | Age: 53
End: 2023-05-08
Payer: COMMERCIAL

## 2023-05-08 VITALS
WEIGHT: 173.7 LBS | BODY MASS INDEX: 26.33 KG/M2 | SYSTOLIC BLOOD PRESSURE: 132 MMHG | DIASTOLIC BLOOD PRESSURE: 83 MMHG | TEMPERATURE: 98.7 F | OXYGEN SATURATION: 97 % | RESPIRATION RATE: 24 BRPM | HEART RATE: 63 BPM | HEIGHT: 68 IN

## 2023-05-08 DIAGNOSIS — R10.2 RIGHT ADNEXAL TENDERNESS: ICD-10-CM

## 2023-05-08 DIAGNOSIS — Z12.31 VISIT FOR SCREENING MAMMOGRAM: ICD-10-CM

## 2023-05-08 DIAGNOSIS — F41.1 GAD (GENERALIZED ANXIETY DISORDER): ICD-10-CM

## 2023-05-08 DIAGNOSIS — F51.01 PRIMARY INSOMNIA: ICD-10-CM

## 2023-05-08 DIAGNOSIS — Z12.4 CERVICAL CANCER SCREENING: ICD-10-CM

## 2023-05-08 DIAGNOSIS — Z23 NEED FOR SHINGLES VACCINE: ICD-10-CM

## 2023-05-08 DIAGNOSIS — R10.2 PELVIC PAIN IN FEMALE: Primary | ICD-10-CM

## 2023-05-08 PROCEDURE — 90471 IMMUNIZATION ADMIN: CPT | Performed by: FAMILY MEDICINE

## 2023-05-08 PROCEDURE — 99214 OFFICE O/P EST MOD 30 MIN: CPT | Mod: 25 | Performed by: FAMILY MEDICINE

## 2023-05-08 PROCEDURE — 87624 HPV HI-RISK TYP POOLED RSLT: CPT | Performed by: FAMILY MEDICINE

## 2023-05-08 PROCEDURE — G0145 SCR C/V CYTO,THINLAYER,RESCR: HCPCS | Performed by: FAMILY MEDICINE

## 2023-05-08 PROCEDURE — 90750 HZV VACC RECOMBINANT IM: CPT | Performed by: FAMILY MEDICINE

## 2023-05-08 ASSESSMENT — PAIN SCALES - GENERAL: PAINLEVEL: NO PAIN (0)

## 2023-05-08 ASSESSMENT — ANXIETY QUESTIONNAIRES: GAD7 TOTAL SCORE: 6

## 2023-05-08 NOTE — PROGRESS NOTES
"  Assessment & Plan     Pelvic pain in female  Right adnexal tenderness  Somewhat cyclical symptoms although more consistent now than previous.  Is still having menses regularly due for menstrual period soon.  - US Pelvic Complete with Transvaginal; Future    YUDI (generalized anxiety disorder)/insomnia  Continues on venlafaxine regularly.  Anxiety has been under relatively good control.  She does use trazodone at bedtime for sleep.  Has a prescription for Xanax for use as needed.    Cervical cancer screening  Pap follow-up today  - Pap Screen with HPV - recommended age 30 - 65 years  - HPV Hold (Lab Only)    Visit for screening mammogram  Mammogram due later this month orders available  - MA SCREENING DIGITAL BILAT - Future  (s+30); Future    Need for shingles vaccine  Initial Shingrix given today.  Booster in 2 to 6 months to complete her course.  - ZOSTER VACCINE RECOMBINANT ADJUVANTED IM NJX    Ordering of each unique test  Prescription drug management         BMI:   Estimated body mass index is 26.41 kg/m  as calculated from the following:    Height as of this encounter: 1.727 m (5' 8\").    Weight as of this encounter: 78.8 kg (173 lb 11.2 oz).   Weight management plan: Discussed healthy diet and exercise guidelines    Patient Instructions   PAP today.    Mammogram due this month   You can call [phone] to schedule this at the [facility] in [place]     Goal weight of 160-165#    Shingles vaccine today.      Pelvic ultrasound recommended.     You can call [phone] to schedule this at the [facility] in [place]              Lucero Gonzalez MD  [facility]   Celina is a 52 year old, presenting for the following health issues:  Recheck Medication and Amenorrhea    History of Present Illness       Mental Health Follow-up:  Patient presents to follow-up on Anxiety.    Patient's anxiety since last visit has been:  No change  The patient is not having " "other symptoms associated with anxiety.  Any significant life events: No  Patient is feeling anxious or having panic attacks.  Patient has no concerns about alcohol or drug use.    She eats 2-3 servings of fruits and vegetables daily.She consumes 1 sweetened beverage(s) daily.She exercises with enough effort to increase her heart rate 30 to 60 minutes per day.  She exercises with enough effort to increase her heart rate 4 days per week.   She is taking medications regularly.  Today's YUDI-7 Score: 6         2/24/2020     3:33 PM 9/14/2021     8:28 AM 5/12/2022     1:38 PM   PHQ   PHQ-9 Total Score 3 3 0   Q9: Thoughts of better off dead/self-harm past 2 weeks Not at all Not at all Not at all         5/12/2022     1:59 PM 12/12/2022     4:17 PM 5/7/2023     3:27 PM   YUDI-7 SCORE   Total Score 4 (minimal anxiety) 4 (minimal anxiety) 6 (mild anxiety)   Total Score 4 4 6       Wt Readings from Last 5 Encounters:   05/08/23 78.8 kg (173 lb 11.2 oz)   09/13/21 75 kg (165 lb 6.4 oz)   02/24/20 73.5 kg (162 lb)   12/26/19 73.9 kg (163 lb)   12/17/19 73.5 kg (162 lb)         Buzzing, ringing in back ground - cleaned without change.  Hearing with some deficit chronically but not changed.  Symptoms starting March -     Menses  Due in 2-3 days - short and heavy.  Right groin discomfort before menses  - pinching - lasting seconds - repetitively generally few days before through a few days after period.    No hot flashes, occasional night sweats.             Review of Systems   Constitutional, HEENT, cardiovascular, pulmonary, gi and gu systems are negative, except as otherwise noted.      Objective    /83 (BP Location: Right arm, Patient Position: Sitting, Cuff Size: Adult Regular)   Pulse 63   Temp 98.7  F (37.1  C) (Oral)   Resp 24   Ht 1.727 m (5' 8\")   Wt 78.8 kg (173 lb 11.2 oz)   LMP 04/13/2023 (Exact Date)   SpO2 97%   BMI 26.41 kg/m    Body mass index is 26.41 kg/m .  Physical Exam   GENERAL: alert, no " distress and over weight  NECK: no adenopathy, no asymmetry, masses, or scars and thyroid normal to palpation  RESP: lungs clear to auscultation - no rales, rhonchi or wheezes  CV: regular rate and rhythm, normal S1 S2, no S3 or S4, no murmur, click or rub, no peripheral edema and peripheral pulses strong  ABDOMEN: soft, nontender, no hepatosplenomegaly, no masses and bowel sounds normal   (female): normal female external genitalia, normal urethral meatus , vaginal mucosa pink, moist, well rugated, normal cervix, adnexae, and uterus without masses. and tenderness right adnexal area without discrete enlargement noted.  MS: no gross musculoskeletal defects noted, no edema  BACK: no CVA tenderness, no paralumbar tenderness  PSYCH: mentation appears normal, affect normal/bright              This chart was documented by provider using a voice activated software called Dragon in addition to manual typing. There may be vocabulary errors or other grammatical errors due to this.

## 2023-05-08 NOTE — PROCEDURES
Prior to immunization administration, verified patients identity using patient s name and date of birth. Please see Immunization Activity for additional information.     Screening Questionnaire for Adult Immunization    Are you sick today?   No   Do you have allergies to medications, food, a vaccine component or latex?   Yes   Have you ever had a serious reaction after receiving a vaccination?   No   Do you have a long-term health problem with heart, lung, kidney, or metabolic disease (e.g., diabetes), asthma, a blood disorder, no spleen, complement component deficiency, a cochlear implant, or a spinal fluid leak?  Are you on long-term aspirin therapy?   No   Do you have cancer, leukemia, HIV/AIDS, or any other immune system problem?   No   Do you have a parent, brother, or sister with an immune system problem?   No   In the past 3 months, have you taken medications that affect  your immune system, such as prednisone, other steroids, or anticancer drugs; drugs for the treatment of rheumatoid arthritis, Crohn s disease, or psoriasis; or have you had radiation treatments?   No   Have you had a seizure, or a brain or other nervous system problem?   No   During the past year, have you received a transfusion of blood or blood    products, or been given immune (gamma) globulin or antiviral drug?   No   For women: Are you pregnant or is there a chance you could become       pregnant during the next month?   No   Have you received any vaccinations in the past 4 weeks?   No     Immunization questionnaire was positive for at least one answer.  Notified Dr. Gonzalez.      Injection of Shingrix given by Lio Kenyon RN. Patient instructed to remain in clinic for 15 minutes afterwards, and to report any adverse reactions.     Screening performed by Lio Kenyon RN on 5/8/2023 at 11:12 AM.

## 2023-05-08 NOTE — PATIENT INSTRUCTIONS
PAP today.    Mammogram due this month   You can call [phone] to schedule this at the [facility] in [place]     Goal weight of 160-165#    Shingles vaccine today.      Pelvic ultrasound recommended.     You can call [phone] to schedule this at the [facility] in [place]

## 2023-05-09 PROBLEM — K86.1 CHRONIC PANCREATITIS, UNSPECIFIED PANCREATITIS TYPE (H): Status: RESOLVED | Noted: 2022-05-13 | Resolved: 2023-05-09

## 2023-05-09 RX ORDER — VENLAFAXINE HYDROCHLORIDE 75 MG/1
75 CAPSULE, EXTENDED RELEASE ORAL DAILY
Qty: 90 CAPSULE | Refills: 1 | Status: SHIPPED | OUTPATIENT
Start: 2023-05-09 | End: 2023-11-20

## 2023-05-09 RX ORDER — TRAZODONE HYDROCHLORIDE 50 MG/1
50 TABLET, FILM COATED ORAL AT BEDTIME
Qty: 90 TABLET | Refills: 3 | Status: SHIPPED | OUTPATIENT
Start: 2023-05-09 | End: 2024-05-27

## 2023-05-10 LAB
BKR LAB AP GYN ADEQUACY: NORMAL
BKR LAB AP GYN INTERPRETATION: NORMAL
BKR LAB AP HPV REFLEX: NORMAL
BKR LAB AP PREVIOUS ABNORMAL: NORMAL
PATH REPORT.COMMENTS IMP SPEC: NORMAL
PATH REPORT.COMMENTS IMP SPEC: NORMAL
PATH REPORT.RELEVANT HX SPEC: NORMAL

## 2023-05-14 LAB
HUMAN PAPILLOMA VIRUS 16 DNA: NEGATIVE
HUMAN PAPILLOMA VIRUS 18 DNA: NEGATIVE
HUMAN PAPILLOMA VIRUS FINAL DIAGNOSIS: NORMAL
HUMAN PAPILLOMA VIRUS OTHER HR: NEGATIVE

## 2023-05-16 ENCOUNTER — ANCILLARY PROCEDURE (OUTPATIENT)
Dept: ULTRASOUND IMAGING | Facility: CLINIC | Age: 53
End: 2023-05-16
Attending: FAMILY MEDICINE
Payer: COMMERCIAL

## 2023-05-16 DIAGNOSIS — R10.2 RIGHT ADNEXAL TENDERNESS: ICD-10-CM

## 2023-05-16 DIAGNOSIS — R10.2 PELVIC PAIN IN FEMALE: ICD-10-CM

## 2023-05-16 PROCEDURE — 76830 TRANSVAGINAL US NON-OB: CPT

## 2023-05-16 PROCEDURE — 76856 US EXAM PELVIC COMPLETE: CPT

## 2023-05-16 NOTE — RESULT ENCOUNTER NOTE
Dear Celina Gonzalez is out of the office and I am reviewing your results.   I recommend scheduling follow up with one of the gynecologists at [facility] ([phone]) formerly called [facility].   You have a polyp inside the uterus and likely outside as well.  This doesn't look like a cancer but you should schedule follow up with gynecology for further evaluation.  Your ovaries were normal.  Please call or MyChart my office with any questions or concerns.   Kat Nava, PAC

## 2023-06-14 ENCOUNTER — ANCILLARY PROCEDURE (OUTPATIENT)
Dept: MAMMOGRAPHY | Facility: CLINIC | Age: 53
End: 2023-06-14
Attending: FAMILY MEDICINE
Payer: COMMERCIAL

## 2023-06-14 DIAGNOSIS — Z12.31 VISIT FOR SCREENING MAMMOGRAM: ICD-10-CM

## 2023-06-14 PROCEDURE — 77063 BREAST TOMOSYNTHESIS BI: CPT | Mod: GC | Performed by: RADIOLOGY

## 2023-06-14 PROCEDURE — 77067 SCR MAMMO BI INCL CAD: CPT | Mod: GC | Performed by: RADIOLOGY

## 2023-07-27 ENCOUNTER — MYC REFILL (OUTPATIENT)
Dept: FAMILY MEDICINE | Facility: CLINIC | Age: 53
End: 2023-07-27
Payer: COMMERCIAL

## 2023-07-27 DIAGNOSIS — L70.0 ACNE VULGARIS: Primary | ICD-10-CM

## 2023-07-27 DIAGNOSIS — F41.1 GAD (GENERALIZED ANXIETY DISORDER): ICD-10-CM

## 2023-07-27 RX ORDER — TRETINOIN 0.5 MG/G
CREAM TOPICAL AT BEDTIME
Qty: 45 G | Refills: 0 | Status: SHIPPED | OUTPATIENT
Start: 2023-07-27 | End: 2023-11-02

## 2023-07-27 RX ORDER — ALPRAZOLAM 0.5 MG
0.5 TABLET ORAL 3 TIMES DAILY PRN
Qty: 10 TABLET | Refills: 0 | Status: SHIPPED | OUTPATIENT
Start: 2023-07-27 | End: 2023-10-24

## 2023-07-27 NOTE — TELEPHONE ENCOUNTER
Pending Prescriptions:                       Disp   Refills    tretinoin (RETIN-A) 0.05 % external cream                        Routing refill request to provider for review/approval because:  Medication is reported/historical

## 2023-10-24 ENCOUNTER — MYC REFILL (OUTPATIENT)
Dept: FAMILY MEDICINE | Facility: CLINIC | Age: 53
End: 2023-10-24
Payer: COMMERCIAL

## 2023-10-24 DIAGNOSIS — F41.1 GAD (GENERALIZED ANXIETY DISORDER): ICD-10-CM

## 2023-10-24 RX ORDER — ALPRAZOLAM 0.5 MG
0.5 TABLET ORAL 3 TIMES DAILY PRN
Qty: 10 TABLET | Refills: 0 | Status: SHIPPED | OUTPATIENT
Start: 2023-10-24 | End: 2024-05-08

## 2023-11-01 DIAGNOSIS — L70.0 ACNE VULGARIS: ICD-10-CM

## 2023-11-02 RX ORDER — TRETINOIN 0.5 MG/G
CREAM TOPICAL
Qty: 45 G | Refills: 0 | Status: SHIPPED | OUTPATIENT
Start: 2023-11-02 | End: 2024-03-16

## 2023-11-18 DIAGNOSIS — F41.1 GAD (GENERALIZED ANXIETY DISORDER): ICD-10-CM

## 2023-11-20 RX ORDER — VENLAFAXINE HYDROCHLORIDE 75 MG/1
75 CAPSULE, EXTENDED RELEASE ORAL DAILY
Qty: 30 CAPSULE | Refills: 0 | Status: SHIPPED | OUTPATIENT
Start: 2023-11-20 | End: 2023-12-11

## 2023-12-11 ENCOUNTER — MYC REFILL (OUTPATIENT)
Dept: FAMILY MEDICINE | Facility: CLINIC | Age: 53
End: 2023-12-11
Payer: COMMERCIAL

## 2023-12-11 DIAGNOSIS — F41.1 GAD (GENERALIZED ANXIETY DISORDER): ICD-10-CM

## 2023-12-12 RX ORDER — VENLAFAXINE HYDROCHLORIDE 75 MG/1
75 CAPSULE, EXTENDED RELEASE ORAL DAILY
Qty: 90 CAPSULE | Refills: 0 | Status: SHIPPED | OUTPATIENT
Start: 2023-12-12 | End: 2024-03-12

## 2024-03-09 DIAGNOSIS — F41.1 GAD (GENERALIZED ANXIETY DISORDER): ICD-10-CM

## 2024-03-12 RX ORDER — VENLAFAXINE HYDROCHLORIDE 75 MG/1
75 CAPSULE, EXTENDED RELEASE ORAL DAILY
Qty: 90 CAPSULE | Refills: 0 | Status: SHIPPED | OUTPATIENT
Start: 2024-03-12 | End: 2024-03-20

## 2024-03-20 ENCOUNTER — MYC REFILL (OUTPATIENT)
Dept: FAMILY MEDICINE | Facility: CLINIC | Age: 54
End: 2024-03-20
Payer: COMMERCIAL

## 2024-03-20 DIAGNOSIS — F41.1 GAD (GENERALIZED ANXIETY DISORDER): ICD-10-CM

## 2024-03-21 RX ORDER — VENLAFAXINE HYDROCHLORIDE 75 MG/1
75 CAPSULE, EXTENDED RELEASE ORAL DAILY
Qty: 90 CAPSULE | Refills: 0 | Status: SHIPPED | OUTPATIENT
Start: 2024-03-21 | End: 2024-05-08

## 2024-03-21 NOTE — TELEPHONE ENCOUNTER
Spoke to [facility] in [place], [place]  they did not receive RX for venlafaxine (EFFEXOR XR) 75 MG 24 hr capsule  on 3/12/24. - Resend

## 2024-05-07 ASSESSMENT — ANXIETY QUESTIONNAIRES
7. FEELING AFRAID AS IF SOMETHING AWFUL MIGHT HAPPEN: SEVERAL DAYS
3. WORRYING TOO MUCH ABOUT DIFFERENT THINGS: SEVERAL DAYS
2. NOT BEING ABLE TO STOP OR CONTROL WORRYING: SEVERAL DAYS
6. BECOMING EASILY ANNOYED OR IRRITABLE: SEVERAL DAYS
GAD7 TOTAL SCORE: 7
1. FEELING NERVOUS, ANXIOUS, OR ON EDGE: MORE THAN HALF THE DAYS
5. BEING SO RESTLESS THAT IT IS HARD TO SIT STILL: NOT AT ALL
7. FEELING AFRAID AS IF SOMETHING AWFUL MIGHT HAPPEN: SEVERAL DAYS
IF YOU CHECKED OFF ANY PROBLEMS ON THIS QUESTIONNAIRE, HOW DIFFICULT HAVE THESE PROBLEMS MADE IT FOR YOU TO DO YOUR WORK, TAKE CARE OF THINGS AT HOME, OR GET ALONG WITH OTHER PEOPLE: SOMEWHAT DIFFICULT
8. IF YOU CHECKED OFF ANY PROBLEMS, HOW DIFFICULT HAVE THESE MADE IT FOR YOU TO DO YOUR WORK, TAKE CARE OF THINGS AT HOME, OR GET ALONG WITH OTHER PEOPLE?: SOMEWHAT DIFFICULT
4. TROUBLE RELAXING: SEVERAL DAYS
GAD7 TOTAL SCORE: 7

## 2024-05-08 ENCOUNTER — MYC MEDICAL ADVICE (OUTPATIENT)
Dept: FAMILY MEDICINE | Facility: CLINIC | Age: 54
End: 2024-05-08

## 2024-05-08 ENCOUNTER — VIRTUAL VISIT (OUTPATIENT)
Dept: FAMILY MEDICINE | Facility: CLINIC | Age: 54
End: 2024-05-08
Payer: COMMERCIAL

## 2024-05-08 DIAGNOSIS — N92.1 MENORRHAGIA WITH IRREGULAR CYCLE: Primary | ICD-10-CM

## 2024-05-08 DIAGNOSIS — F41.1 GAD (GENERALIZED ANXIETY DISORDER): ICD-10-CM

## 2024-05-08 DIAGNOSIS — H10.13 ALLERGIC CONJUNCTIVITIS OF BOTH EYES: ICD-10-CM

## 2024-05-08 PROCEDURE — 96127 BRIEF EMOTIONAL/BEHAV ASSMT: CPT | Mod: 95 | Performed by: FAMILY MEDICINE

## 2024-05-08 PROCEDURE — 99215 OFFICE O/P EST HI 40 MIN: CPT | Mod: 95 | Performed by: FAMILY MEDICINE

## 2024-05-08 PROCEDURE — G2211 COMPLEX E/M VISIT ADD ON: HCPCS | Mod: 95 | Performed by: FAMILY MEDICINE

## 2024-05-08 RX ORDER — VENLAFAXINE HYDROCHLORIDE 150 MG/1
150 CAPSULE, EXTENDED RELEASE ORAL DAILY
Qty: 90 CAPSULE | Refills: 0 | Status: SHIPPED | OUTPATIENT
Start: 2024-05-08 | End: 2024-08-26

## 2024-05-08 RX ORDER — ALPRAZOLAM 0.5 MG
0.5 TABLET ORAL 3 TIMES DAILY PRN
Qty: 10 TABLET | Refills: 0 | Status: SHIPPED | OUTPATIENT
Start: 2024-05-08

## 2024-05-08 RX ORDER — CROMOLYN SODIUM 40 MG/ML
1 SOLUTION/ DROPS OPHTHALMIC 4 TIMES DAILY
Qty: 10 ML | Refills: 2 | Status: SHIPPED | OUTPATIENT
Start: 2024-05-08 | End: 2024-08-26

## 2024-05-08 ASSESSMENT — ENCOUNTER SYMPTOMS: NERVOUS/ANXIOUS: 1

## 2024-05-08 NOTE — PATIENT INSTRUCTIONS
I would recommend evaluation with gynecology for the menstrual concerns that you have. You can call [phone] [facility] [place]     Recurrent eye symptoms, continue Pataday as previous.  Add cromolyn topically as given.    Follow-up with the eye doctor as needed.    For anxiety symptoms, increase venlafaxine to 150 mg daily.  You can take 2 of the 75 mg tablet you have at home until they are gone.  I have sent in a new prescription and you should take 1 of those when you fill that prescription.  Refill alprazolam for as needed use.  If increased anxiety is noted follow-up for med adjustment as recommended.  I will send you a message in a month for an update on symptoms.

## 2024-05-08 NOTE — PROGRESS NOTES
"    Instructions Relayed to Patient by Virtual Roomer:     Patient is active on [email or web address]:   Relayed following to patient: \"It looks like you are active on [email or web address], are you able to join the visit this way? If not, do you need us to send you a link now or would you like your provider to send a link via text or email when they are ready to initiate the visit?\"      Patient Confirmed they will join visit via: [employer]reima  Reminded patient to ensure they were logged on to virtual visit by arrival time listed.   Asked if patient has flexibility to initiate visit sooner than arrival time: patient is unable to initiate visit earlier than arrival time     If pediatric virtual visit, ensured pediatric patient along with parent/guardian will be present for video visit.     Patient offered the website [email or web address] and/or phone number to [email or web address] Help line: [phone]    Celina is a 53 year old who is being evaluated via a billable video visit.    How would you like to obtain your AVS? Adreima  If the video visit is dropped, the invitation should be resent by: Text to cell phone: [phone]  Will anyone else be joining your video visit? No      Assessment & Plan     Menorrhagia with irregular cycle  Patient is reporting a heavier menstrual flow with slight increased frequency (3 to 4 weeks between cycles now) she has a known fundal endometrial polyp that was noted on ultrasound in May of last year.  We had discussed gynecologic evaluation for that if she had heavier bleeding and what is happening now.  She is interested in talking to gynecologist regarding this.  She may benefit from hysteroscopy and D&C for management and diagnostic purposes.  We had a long discussion regarding hormones and hormone treatments.  She could potentially benefit from a progesterone IUD but she is reluctant to consider IUD use.  We discussed topical estrogen and progesterone and I have not recommended those due to the lack of " consistency with dosing.  Would avoid oral estrogen due to her age.  She is having cycles and would not require withdrawal progesterone at this time.  - Ob/Gyn  Referral; Future    YUDI (generalized anxiety disorder)  Anxiety symptoms are not entirely controlled on the current venlafaxine dose.  We discussed that at times an increase in the event venlafaxine will result in improvement in anxiety.  Even up to the 225 dose of venlafaxine can be beneficial for anxiety.  At this point plan to go up to 150 mg daily and she will monitor the symptoms.  I did give her a small amount of Xanax primarily for travel and acute events is what she uses this prescription for.  Most recent refill was October 2023 for 10 tablets.  - venlafaxine (EFFEXOR XR) 150 MG 24 hr capsule; Take 1 capsule (150 mg) by mouth daily  - ALPRAZolam (XANAX) 0.5 MG tablet; Take 1 tablet (0.5 mg) by mouth 3 times daily as needed for anxiety    Allergic conjunctivitis  Has been using over-the-counter Pataday with some benefit.  She also struggles with dry eye and has been using treatments recommended by her eye doctor.  She also takes Claritin orally on a regular basis.  Requesting an alternative eyedrop because the Pataday does not seem to be working well for her.  Discussed the option of using cromolyn type eyedrop and a prescription is given for this.  Hopefully this will decrease the allergic symptoms.  Follow-up if symptoms persist.  Continue ongoing care with optometry/ophthalmology.  - cromolyn (OPTICROM) 4 % ophthalmic solution; Place 1 drop into both eyes 4 times daily    Review of the result(s) of each unique test - pelvic ultrasound  Prescription drug management  48 minutes spent by me on the date of the encounter doing chart review, review of test results, interpretation of tests, patient visit, and documentation         Patient Instructions   I would recommend evaluation with gynecology for the menstrual concerns that you have. You  can call [phone] to schedule this at the [facility] building in [place]     Recurrent eye symptoms, continue Pataday as previous.  Add cromolyn topically as given.    Follow-up with the eye doctor as needed.    For anxiety symptoms, increase venlafaxine to 150 mg daily.  You can take 2 of the 75 mg tablet you have at home until they are gone.  I have sent in a new prescription and you should take 1 of those when you fill that prescription.  Refill alprazolam for as needed use.  If increased anxiety is noted follow-up for med adjustment as recommended.  I will send you a message in a month for an update on symptoms.    Subjective   Celina is a 53 year old, presenting for the following health issues:  Anxiety      5/8/2024     8:35 AM   Additional Questions   Roomed by azul   Accompanied by self     Anxiety    History of Present Illness       Mental Health Follow-up:  Patient presents to follow-up on Anxiety.    Patient's anxiety since last visit has been:  Worse  The patient is having other symptoms associated with anxiety.  Any significant life events: other  Patient is feeling anxious or having panic attacks.  Patient has no concerns about alcohol or drug use.    Reason for visit:  Med check and menopause questions    She eats 2-3 servings of fruits and vegetables daily.She consumes 1 sweetened beverage(s) daily.She exercises with enough effort to increase her heart rate 9 or less minutes per day.  She exercises with enough effort to increase her heart rate 3 or less days per week.   She is taking medications regularly.   Some increase in anxiety symptoms - brain fog, tension, biting lip.  No situational worries.      12/12/2022     4:17 PM 5/7/2023     3:27 PM 5/7/2024     8:07 AM   YUDI-7 SCORE   Total Score 4 (minimal anxiety) 6 (mild anxiety) 7 (mild anxiety)   Total Score 4 6 7            Perimenopausal symptoms -   March - menses x 2 and again 3 weeks later in April - heavy. No significant hot  "flashes.  \"Running warm\"    3-4 days heavy for 2-3 of them.  Weakness/exhaustion.  Did increase iron in diet -  tends to be constipated.         Severe eye allergy and chronic dry eye - pataday, hot packs, massage.            Review of Systems  Constitutional, HEENT, cardiovascular, pulmonary, gi and gu systems are negative, except as otherwise noted.      Objective           Vitals:  No vitals were obtained today due to virtual visit.    Physical Exam   GENERAL: alert and no distress  EYES: Eyes grossly normal to inspection.  No discharge or erythema, or obvious scleral/conjunctival abnormalities.  RESP: No audible wheeze, cough, or visible cyanosis.    SKIN: Visible skin clear. No significant rash, abnormal pigmentation or lesions.  NEURO: Cranial nerves grossly intact.  Mentation and speech appropriate for age.  PSYCH: Appropriate affect, tone, and pace of words          Video-Visit Details    Type of service:  Video Visit   Originating Location (pt. Location): Home    Distant Location (provider location):  Off-site  Platform used for Video Visit: [facility]  Signed Electronically by: Lucero Gonzalez MD        This chart was documented by provider using a voice activated software called Dragon in addition to manual typing. There may be vocabulary errors or other grammatical errors due to this.     "

## 2024-05-15 ENCOUNTER — PATIENT OUTREACH (OUTPATIENT)
Dept: CARE COORDINATION | Facility: CLINIC | Age: 54
End: 2024-05-15
Payer: COMMERCIAL

## 2024-05-24 DIAGNOSIS — F51.01 PRIMARY INSOMNIA: ICD-10-CM

## 2024-05-24 DIAGNOSIS — F41.1 GAD (GENERALIZED ANXIETY DISORDER): ICD-10-CM

## 2024-05-27 RX ORDER — TRAZODONE HYDROCHLORIDE 50 MG/1
50 TABLET, FILM COATED ORAL AT BEDTIME
Qty: 90 TABLET | Refills: 3 | Status: SHIPPED | OUTPATIENT
Start: 2024-05-27 | End: 2024-05-29

## 2024-05-29 ENCOUNTER — MYC REFILL (OUTPATIENT)
Dept: FAMILY MEDICINE | Facility: CLINIC | Age: 54
End: 2024-05-29
Payer: COMMERCIAL

## 2024-05-29 DIAGNOSIS — F41.1 GAD (GENERALIZED ANXIETY DISORDER): ICD-10-CM

## 2024-05-29 DIAGNOSIS — F51.01 PRIMARY INSOMNIA: ICD-10-CM

## 2024-05-29 RX ORDER — TRAZODONE HYDROCHLORIDE 50 MG/1
50 TABLET, FILM COATED ORAL AT BEDTIME
Qty: 90 TABLET | Refills: 3 | Status: SHIPPED | OUTPATIENT
Start: 2024-05-29 | End: 2024-08-26

## 2024-06-02 ENCOUNTER — HEALTH MAINTENANCE LETTER (OUTPATIENT)
Age: 54
End: 2024-06-02

## 2024-06-05 ASSESSMENT — ANXIETY QUESTIONNAIRES
GAD7 TOTAL SCORE: 8
GAD7 TOTAL SCORE: 8
7. FEELING AFRAID AS IF SOMETHING AWFUL MIGHT HAPPEN: SEVERAL DAYS
1. FEELING NERVOUS, ANXIOUS, OR ON EDGE: MORE THAN HALF THE DAYS
IF YOU CHECKED OFF ANY PROBLEMS ON THIS QUESTIONNAIRE, HOW DIFFICULT HAVE THESE PROBLEMS MADE IT FOR YOU TO DO YOUR WORK, TAKE CARE OF THINGS AT HOME, OR GET ALONG WITH OTHER PEOPLE: SOMEWHAT DIFFICULT
2. NOT BEING ABLE TO STOP OR CONTROL WORRYING: SEVERAL DAYS
4. TROUBLE RELAXING: SEVERAL DAYS
6. BECOMING EASILY ANNOYED OR IRRITABLE: SEVERAL DAYS
GAD7 TOTAL SCORE: 8
7. FEELING AFRAID AS IF SOMETHING AWFUL MIGHT HAPPEN: SEVERAL DAYS
5. BEING SO RESTLESS THAT IT IS HARD TO SIT STILL: SEVERAL DAYS
8. IF YOU CHECKED OFF ANY PROBLEMS, HOW DIFFICULT HAVE THESE MADE IT FOR YOU TO DO YOUR WORK, TAKE CARE OF THINGS AT HOME, OR GET ALONG WITH OTHER PEOPLE?: SOMEWHAT DIFFICULT
3. WORRYING TOO MUCH ABOUT DIFFERENT THINGS: SEVERAL DAYS

## 2024-06-05 ASSESSMENT — PATIENT HEALTH QUESTIONNAIRE - PHQ9
SUM OF ALL RESPONSES TO PHQ QUESTIONS 1-9: 2
10. IF YOU CHECKED OFF ANY PROBLEMS, HOW DIFFICULT HAVE THESE PROBLEMS MADE IT FOR YOU TO DO YOUR WORK, TAKE CARE OF THINGS AT HOME, OR GET ALONG WITH OTHER PEOPLE: NOT DIFFICULT AT ALL
SUM OF ALL RESPONSES TO PHQ QUESTIONS 1-9: 2

## 2024-06-06 NOTE — TELEPHONE ENCOUNTER
9/14/2021     8:28 AM 5/12/2022     1:38 PM 6/5/2024     9:17 PM   PHQ   PHQ-9 Total Score 3 0 2   Q9: Thoughts of better off dead/self-harm past 2 weeks Not at all Not at all Not at all         5/7/2023     3:27 PM 5/7/2024     8:07 AM 6/5/2024     9:18 PM   YUDI-7 SCORE   Total Score 6 (mild anxiety) 7 (mild anxiety) 8 (mild anxiety)   Total Score 6 7 8

## 2024-06-12 ENCOUNTER — PATIENT OUTREACH (OUTPATIENT)
Dept: CARE COORDINATION | Facility: CLINIC | Age: 54
End: 2024-06-12
Payer: COMMERCIAL

## 2024-06-26 ENCOUNTER — OFFICE VISIT (OUTPATIENT)
Dept: FAMILY MEDICINE | Facility: CLINIC | Age: 54
End: 2024-06-26
Payer: COMMERCIAL

## 2024-06-26 VITALS
DIASTOLIC BLOOD PRESSURE: 82 MMHG | WEIGHT: 171.9 LBS | TEMPERATURE: 98.2 F | HEART RATE: 73 BPM | BODY MASS INDEX: 26.98 KG/M2 | OXYGEN SATURATION: 99 % | RESPIRATION RATE: 12 BRPM | HEIGHT: 67 IN | SYSTOLIC BLOOD PRESSURE: 118 MMHG

## 2024-06-26 DIAGNOSIS — K29.00 ACUTE GASTRITIS WITHOUT HEMORRHAGE, UNSPECIFIED GASTRITIS TYPE: ICD-10-CM

## 2024-06-26 DIAGNOSIS — R14.0 ABDOMINAL BLOATING: ICD-10-CM

## 2024-06-26 DIAGNOSIS — K59.01 SLOW TRANSIT CONSTIPATION: Primary | ICD-10-CM

## 2024-06-26 LAB
ERYTHROCYTE [DISTWIDTH] IN BLOOD BY AUTOMATED COUNT: 12.1 % (ref 10–15)
HCT VFR BLD AUTO: 44.3 % (ref 35–47)
HGB BLD-MCNC: 14.6 G/DL (ref 11.7–15.7)
MCH RBC QN AUTO: 31.1 PG (ref 26.5–33)
MCHC RBC AUTO-ENTMCNC: 33 G/DL (ref 31.5–36.5)
MCV RBC AUTO: 95 FL (ref 78–100)
PLATELET # BLD AUTO: 159 10E3/UL (ref 150–450)
RBC # BLD AUTO: 4.69 10E6/UL (ref 3.8–5.2)
WBC # BLD AUTO: 8.7 10E3/UL (ref 4–11)

## 2024-06-26 PROCEDURE — 85027 COMPLETE CBC AUTOMATED: CPT | Performed by: PHYSICIAN ASSISTANT

## 2024-06-26 PROCEDURE — 99214 OFFICE O/P EST MOD 30 MIN: CPT | Performed by: PHYSICIAN ASSISTANT

## 2024-06-26 PROCEDURE — 36415 COLL VENOUS BLD VENIPUNCTURE: CPT | Performed by: PHYSICIAN ASSISTANT

## 2024-06-26 PROCEDURE — G2211 COMPLEX E/M VISIT ADD ON: HCPCS | Performed by: PHYSICIAN ASSISTANT

## 2024-06-26 PROCEDURE — 86364 TISS TRNSGLTMNASE EA IG CLAS: CPT | Performed by: PHYSICIAN ASSISTANT

## 2024-06-26 PROCEDURE — 84443 ASSAY THYROID STIM HORMONE: CPT | Performed by: PHYSICIAN ASSISTANT

## 2024-06-26 PROCEDURE — 82784 ASSAY IGA/IGD/IGG/IGM EACH: CPT | Performed by: PHYSICIAN ASSISTANT

## 2024-06-26 ASSESSMENT — PAIN SCALES - GENERAL: PAINLEVEL: NO PAIN (0)

## 2024-06-26 NOTE — PATIENT INSTRUCTIONS
Constipation  Goal is at least 1 soft bowel movement daily (toothpaste consistency).   - Drink plenty of liquids, especially water.   - Eat plenty of fruits and vegetables. Fruits such as peaches, plums, pears, oranges and   prunes/prune juice can help.   - Miralax 1 capful dissolved in water or juice 3 to 4 x per day.    Bowel clean out with miralax. 1 capful - 3-4 x daily for 1-2 days  Then start a maintenance with miralax half-cap to whole cap daily. Can adjust the amount needed to get soft, regular, stool.     Cont famotidine for the  month.     Fod-Map diet. See attached.     See  in 1 month.

## 2024-06-26 NOTE — PROGRESS NOTES
"  Assessment & Plan     Slow transit constipation  Abdominal bloating  Chronic constipation worse with traveling.   Is using a magnesium product that is finally providing her some results.   Did also discuss a miralax clean out and following clean out phase, advised starting on a daily miralax bowel regimen.   Monitor for bloating feeling to improve with alleviating the constipation  If bloating persists- discussed trial of low fodmap diet. Info in AVS.   Labs evaluation ordered per below- cbc, tsh, and celiac labs .  Reviewed urgent care labs from last week. Normal.   Had colonoscopy 15mo ago that was normal.   No red flag sx.   If bloating sx persist once constipation is alleviated, will follow up with PCP. Consider EGD or imaging at that time. Follow up with PCP in 1 mo  - Tissue transglutaminase wilder IgA and IgG; Future  - IgA; Future  - CBC with platelets; Future  - TSH with free T4 reflex; Future  - CBC with platelets; Future    Acute gastritis without hemorrhage, unspecified gastritis type  Endorses \"acidy\" for \"long time\". Has been feeling better since starting the famotidine 20mg BID. She will continue with that for the month.       MED REC REQUIRED  Post Medication Reconciliation Status:     BMI  Estimated body mass index is 26.57 kg/m  as calculated from the following:    Height as of this encounter: 1.713 m (5' 7.44\").    Weight as of this encounter: 78 kg (171 lb 14.4 oz).         Follow Up: see above. Additionally patient was instructed to contact clinic for worsening symptoms, non-improvement in time frame discussed, and for questions regarding treatment plan.   For virtual visits, the patient was advised to be seen for in person evaluation if symptoms or condition are worsening or non-improvement as expected.   Penny Crespo PA-C    Brian Patrick is a 53 year old, presenting for the following health issues:  UC Follow-Up        6/26/2024     2:14 PM   Additional Questions   Roomed by " "Luz         6/26/2024     2:14 PM   Patient Reported Additional Medications   Patient reports taking the following new medications magnesium powder supplement, famotidine 20mg, laxative/stool softener     HPI       ED/UC Followup:    Facility:  [facility]   Date of visit: 6/17/2024  Reason for visit: abdominal pain, constipation  Current Status: still constipated, still having upper abdominal pain, patient stated after stopping the laxative/stool softener and starting the magnesium she started feeling better    Patient would also like provider to take a look at the xray from the UC visit as the results were not explained very well to patient. Patient stated the provider had told her her \"intestine is hanging down\" and didn't clarify exactly what that meant. She is also wondering if a follow up xray would be appropriate to ensure she is clearing out.   ----    Started to feel like abdomen owens/bulge below the left rib 3 weeks ago and also at same time was kind of \"constipated and my abdomen was bloated\" . Does tend toward constipation. Usually has BM every other day, and stools tend to be hard- bristol 2 most often to1 at times. Trys to avoid straining. Never bleeding.  In order for stools to be soft has to take magnesium and eat certain diet.     Past few weeks more constipated and discomfort in abdomen- not pain. Went to urgent care. They did an xray and told me \"I was full\". They gave me senokot and also gave rx for famotidine as acid reducer. Going regularly but small amount- feels like more in there. Senokot didn't do much for me. Tried other things she had at home. Magnesium- tablets/chews. Tried Fiber gummies and benfiber 2 tsp daily. Doesn't feel like getting it out. So did 2 enemas. Didn't get much out.     Then today had 2 larger bowel movements after using an \"oxy-powder magnesium\" with high doses of magnesium. Feels like looser/diarrhea but at least its coming out. " "    Still feels gas, bloating, full. Not painful.   Dairy makes it worse. Unsure other dietary triggers    Famotidine rx given from urgent care helps \"the acidy feeling\". Had that \"for a long time\". Nonsmoker. No alcohol.     No fevers.   No NV  No bloody or tarry stools  No unplanned wt loss.  Periods regular.   No urinary sx.     Hx of 1 .     Colonoscopy 23- normal repeat in 10 yr     In urgent care 2024 -   Hypylori test negative  Also did comp panel and lipase, amylase which were normal.     Diet- no restrictions.   Work- not currently   Exercise- walk. 3x a week- lift, cardio        Review of Systems  Constitutional, HEENT, cardiovascular, pulmonary, gi and gu systems are negative, except as otherwise noted.      Objective    /82   Pulse 73   Temp 98.2  F (36.8  C) (Oral)   Resp 12   Ht 1.713 m (5' 7.44\")   Wt 78 kg (171 lb 14.4 oz)   LMP 2024 (Approximate)   SpO2 99%   BMI 26.57 kg/m    Body mass index is 26.57 kg/m .  Physical Exam   GENERAL: alert and no distress  EYES: Eyes grossly normal to inspection, PERRL and conjunctivae and sclerae normal  HENT: ear canals and TM's normal, nose and mouth without ulcers or lesions  NECK: no adenopathy, no asymmetry, masses, or scars  RESP: lungs clear to auscultation - no rales, rhonchi or wheezes  CV: regular rate and rhythm, normal S1 S2, no S3 or S4, no murmur, click or rub, no peripheral edema  ABDOMEN: +BS in all quadrants,  soft, nontender, no hepatosplenomegaly, no masses and bowel sounds normal  MS: no gross musculoskeletal defects noted, no edema  NEURO: Normal strength and tone, mentation intact and speech normal  PSYCH: mentation appears normal, affect normal/bright    Results for orders placed or performed in visit on 24   CBC with platelets     Status: Normal   Result Value Ref Range    WBC Count 8.7 4.0 - 11.0 10e3/uL    RBC Count 4.69 3.80 - 5.20 10e6/uL    Hemoglobin 14.6 11.7 - 15.7 g/dL    Hematocrit " 44.3 35.0 - 47.0 %    MCV 95 78 - 100 fL    MCH 31.1 26.5 - 33.0 pg    MCHC 33.0 31.5 - 36.5 g/dL    RDW 12.1 10.0 - 15.0 %    Platelet Count 159 150 - 450 10e3/uL           Signed Electronically by: Penny Crespo PA-C

## 2024-06-27 LAB
IGA SERPL-MCNC: 129 MG/DL (ref 84–499)
TSH SERPL DL<=0.005 MIU/L-ACNC: 2.67 UIU/ML (ref 0.3–4.2)

## 2024-06-28 LAB
TTG IGA SER-ACNC: <0.2 U/ML
TTG IGG SER-ACNC: <0.6 U/ML

## 2024-07-09 ENCOUNTER — ANCILLARY PROCEDURE (OUTPATIENT)
Dept: MAMMOGRAPHY | Facility: CLINIC | Age: 54
End: 2024-07-09
Attending: FAMILY MEDICINE
Payer: COMMERCIAL

## 2024-07-09 DIAGNOSIS — Z12.31 VISIT FOR SCREENING MAMMOGRAM: ICD-10-CM

## 2024-07-09 PROCEDURE — 77067 SCR MAMMO BI INCL CAD: CPT | Performed by: RADIOLOGY

## 2024-07-09 PROCEDURE — 77063 BREAST TOMOSYNTHESIS BI: CPT | Performed by: RADIOLOGY

## 2024-07-16 NOTE — TELEPHONE ENCOUNTER
Orientation: A&Ox4  Vitals/Pain: VSS on RA ex /90 at 0425, 1L NC with activity/ambulation. Pt denied pain thru shift.   Lines/Drains: L PIV SL   LS: Clear upper, expiratory wheeze lower bilateral   GI/: BS active, no BM this shift. Pt having adequate urine output throughout shift. Regular diet.   Ambulation/Assist: SBA  Skin/Wounds: Skin intact, scattered bruising   Sleep quality: Awake several times in the night, pt anxious gave lorazepam x1   Plan: Continuous pulse ox study completed, pt 95% on RA most of night.      Routing refill request to provider for review/approval because:  Labs not current:  creatinine    Appointments in Next Year      May 12, 2022  3:00 PM  (Arrive by 2:40 PM)  Provider Visit with Lucero Gonzalez MD  [facility] ([facility] ) [phone]          Beverly Bergeron RN, BSN  [facility]

## 2024-08-26 ENCOUNTER — MYC REFILL (OUTPATIENT)
Dept: FAMILY MEDICINE | Facility: CLINIC | Age: 54
End: 2024-08-26
Payer: COMMERCIAL

## 2024-08-26 DIAGNOSIS — F41.1 GAD (GENERALIZED ANXIETY DISORDER): ICD-10-CM

## 2024-08-26 DIAGNOSIS — H10.13 ALLERGIC CONJUNCTIVITIS OF BOTH EYES: ICD-10-CM

## 2024-08-26 DIAGNOSIS — L70.0 ACNE VULGARIS: ICD-10-CM

## 2024-08-26 DIAGNOSIS — F51.01 PRIMARY INSOMNIA: ICD-10-CM

## 2024-08-27 RX ORDER — TRETINOIN 0.5 MG/G
CREAM TOPICAL
Qty: 45 G | Refills: 1 | Status: SHIPPED | OUTPATIENT
Start: 2024-08-27 | End: 2024-08-28

## 2024-08-28 RX ORDER — CROMOLYN SODIUM 40 MG/ML
1 SOLUTION/ DROPS OPHTHALMIC 4 TIMES DAILY
Qty: 10 ML | Refills: 0 | Status: SHIPPED | OUTPATIENT
Start: 2024-08-28

## 2024-08-28 RX ORDER — TRETINOIN 0.5 MG/G
CREAM TOPICAL
Qty: 45 G | Refills: 1 | Status: SHIPPED | OUTPATIENT
Start: 2024-08-28

## 2024-08-28 RX ORDER — VENLAFAXINE HYDROCHLORIDE 150 MG/1
150 CAPSULE, EXTENDED RELEASE ORAL DAILY
Qty: 90 CAPSULE | Refills: 0 | Status: SHIPPED | OUTPATIENT
Start: 2024-08-28

## 2024-08-28 RX ORDER — TRETINOIN 0.5 MG/G
CREAM TOPICAL
Qty: 45 G | Refills: 1 | Status: SHIPPED | OUTPATIENT
Start: 2024-08-28 | End: 2024-08-28

## 2024-08-28 RX ORDER — TRAZODONE HYDROCHLORIDE 50 MG/1
50 TABLET, FILM COATED ORAL AT BEDTIME
Qty: 90 TABLET | Refills: 0 | Status: SHIPPED | OUTPATIENT
Start: 2024-08-28

## 2024-09-28 SDOH — HEALTH STABILITY: PHYSICAL HEALTH: ON AVERAGE, HOW MANY MINUTES DO YOU ENGAGE IN EXERCISE AT THIS LEVEL?: 40 MIN

## 2024-09-28 SDOH — HEALTH STABILITY: PHYSICAL HEALTH: ON AVERAGE, HOW MANY DAYS PER WEEK DO YOU ENGAGE IN MODERATE TO STRENUOUS EXERCISE (LIKE A BRISK WALK)?: 4 DAYS

## 2024-09-28 ASSESSMENT — SOCIAL DETERMINANTS OF HEALTH (SDOH): HOW OFTEN DO YOU GET TOGETHER WITH FRIENDS OR RELATIVES?: ONCE A WEEK

## 2024-10-03 ENCOUNTER — OFFICE VISIT (OUTPATIENT)
Dept: FAMILY MEDICINE | Facility: CLINIC | Age: 54
End: 2024-10-03
Payer: COMMERCIAL

## 2024-10-03 VITALS
SYSTOLIC BLOOD PRESSURE: 129 MMHG | HEART RATE: 77 BPM | HEIGHT: 68 IN | BODY MASS INDEX: 26.67 KG/M2 | WEIGHT: 176 LBS | DIASTOLIC BLOOD PRESSURE: 85 MMHG | OXYGEN SATURATION: 95 % | RESPIRATION RATE: 14 BRPM | TEMPERATURE: 98.4 F

## 2024-10-03 DIAGNOSIS — Z13.1 SCREENING FOR DIABETES MELLITUS: ICD-10-CM

## 2024-10-03 DIAGNOSIS — F41.1 GAD (GENERALIZED ANXIETY DISORDER): ICD-10-CM

## 2024-10-03 DIAGNOSIS — Z13.220 LIPID SCREENING: ICD-10-CM

## 2024-10-03 DIAGNOSIS — K21.9 GASTROESOPHAGEAL REFLUX DISEASE WITHOUT ESOPHAGITIS: ICD-10-CM

## 2024-10-03 DIAGNOSIS — L70.0 ACNE VULGARIS: ICD-10-CM

## 2024-10-03 DIAGNOSIS — Z00.00 ROUTINE GENERAL MEDICAL EXAMINATION AT A HEALTH CARE FACILITY: Primary | ICD-10-CM

## 2024-10-03 PROCEDURE — 82947 ASSAY GLUCOSE BLOOD QUANT: CPT | Performed by: FAMILY MEDICINE

## 2024-10-03 PROCEDURE — 36415 COLL VENOUS BLD VENIPUNCTURE: CPT | Performed by: FAMILY MEDICINE

## 2024-10-03 PROCEDURE — 82306 VITAMIN D 25 HYDROXY: CPT | Performed by: FAMILY MEDICINE

## 2024-10-03 PROCEDURE — 99396 PREV VISIT EST AGE 40-64: CPT | Performed by: FAMILY MEDICINE

## 2024-10-03 PROCEDURE — 99213 OFFICE O/P EST LOW 20 MIN: CPT | Mod: 25 | Performed by: FAMILY MEDICINE

## 2024-10-03 PROCEDURE — 80061 LIPID PANEL: CPT | Performed by: FAMILY MEDICINE

## 2024-10-03 PROCEDURE — 82607 VITAMIN B-12: CPT | Performed by: FAMILY MEDICINE

## 2024-10-03 PROCEDURE — 83735 ASSAY OF MAGNESIUM: CPT | Performed by: FAMILY MEDICINE

## 2024-10-03 RX ORDER — FAMOTIDINE 40 MG/1
40 TABLET, FILM COATED ORAL DAILY
Qty: 90 TABLET | Refills: 1 | Status: SHIPPED | OUTPATIENT
Start: 2024-10-03

## 2024-10-03 ASSESSMENT — PAIN SCALES - GENERAL: PAINLEVEL: NO PAIN (0)

## 2024-10-03 NOTE — PATIENT INSTRUCTIONS
Labs today.  Refill medications will be sent when results return.    Begin Famotidine at night to prevent heartburn symptoms.   Limit caffeine and eating later in the day.         Patient Education   Preventive Care Advice   This is general advice given by our system to help you stay healthy. However, your care team may have specific advice just for you. Please talk to your care team about your preventive care needs.  Nutrition  Eat 5 or more servings of fruits and vegetables each day.  Try wheat bread, brown rice and whole grain pasta (instead of white bread, rice, and pasta).  Get enough calcium and vitamin D. Check the label on foods and aim for 100% of the RDA (recommended daily allowance).  Lifestyle  Exercise at least 150 minutes each week  (30 minutes a day, 5 days a week).  Do muscle strengthening activities 2 days a week. These help control your weight and prevent disease.  No smoking.  Wear sunscreen to prevent skin cancer.  Have a dental exam and cleaning every 6 months.  Yearly exams  See your health care team every year to talk about:  Any changes in your health.  Any medicines your care team has prescribed.  Preventive care, family planning, and ways to prevent chronic diseases.  Shots (vaccines)   HPV shots (up to age 26), if you've never had them before.  Hepatitis B shots (up to age 59), if you've never had them before.  COVID-19 shot: Get this shot when it's due.  Flu shot: Get a flu shot every year.  Tetanus shot: Get a tetanus shot every 10 years.  Pneumococcal, hepatitis A, and RSV shots: Ask your care team if you need these based on your risk.  Shingles shot (for age 50 and up)  General health tests  Diabetes screening:  Starting at age 35, Get screened for diabetes at least every 3 years.  If you are younger than age 35, ask your care team if you should be screened for diabetes.  Cholesterol test: At age 39, start having a cholesterol test every 5 years, or more often if advised.  Bone density  scan (DEXA): At age 50, ask your care team if you should have this scan for osteoporosis (brittle bones).  Hepatitis C: Get tested at least once in your life.  STIs (sexually transmitted infections)  Before age 24: Ask your care team if you should be screened for STIs.  After age 24: Get screened for STIs if you're at risk. You are at risk for STIs (including HIV) if:  You are sexually active with more than one person.  You don't use condoms every time.  You or a partner was diagnosed with a sexually transmitted infection.  If you are at risk for HIV, ask about PrEP medicine to prevent HIV.  Get tested for HIV at least once in your life, whether you are at risk for HIV or not.  Cancer screening tests  Cervical cancer screening: If you have a cervix, begin getting regular cervical cancer screening tests starting at age 21.  Breast cancer scan (mammogram): If you've ever had breasts, begin having regular mammograms starting at age 40. This is a scan to check for breast cancer.  Colon cancer screening: It is important to start screening for colon cancer at age 45.  Have a colonoscopy test every 10 years (or more often if you're at risk) Or, ask your provider about stool tests like a FIT test every year or Cologuard test every 3 years.  To learn more about your testing options, visit:   .  For help making a decision, visit:   https://bit.ly/bt55360.  Prostate cancer screening test: If you have a prostate, ask your care team if a prostate cancer screening test (PSA) at age 55 is right for you.  Lung cancer screening: If you are a current or former smoker ages 50 to 80, ask your care team if ongoing lung cancer screenings are right for you.  For informational purposes only. Not to replace the advice of your health care provider. Copyright   2023 [employer]. All rights reserved. Clinically reviewed by the [facility] Transitions Program. [employer] 382521 - REV 01/24.

## 2024-10-03 NOTE — PROGRESS NOTES
Preventive Care Visit  [facility]  Lucero Gonzalez MD, Family Medicine  Oct 3, 2024      Assessment & Plan     Routine general medical examination at a health care facility  Screening and preventive care discussed.  Nonfasting labs today.  Immunization update offered patient declined  - Vitamin B12; Future  - Vitamin D Deficiency; Future  - Magnesium; Future  - Vitamin B12  - Vitamin D Deficiency  - Magnesium    Acne vulgaris  Continue use of Retin-A as needed.  Has adequate supply available per her report.    YUDI (generalized anxiety disorder)  Awaiting lab results.  Refill of venlafaxine, trazodone, and episodic Xanax can be given when results return.    Gastroesophageal reflux disease without esophagitis  Bedtime famotidine planned.  Monitor symptoms and if persistent symptoms noted and then an alternative treatment with PPI may be needed.  This has been effective in the past.  - famotidine (PEPCID) 40 MG tablet; Take 1 tablet (40 mg) by mouth daily.    Screening for diabetes mellitus  Blood sugar today  - Glucose; Future  - Glucose    Lipid screening  Nonfasting lipid panel today  - Lipid panel reflex to direct LDL Non-fasting; Future  - Lipid panel reflex to direct LDL Non-fasting    Patient has been advised of split billing requirements and indicates understanding: Yes        Counseling  Appropriate preventive services were addressed with this patient via screening, questionnaire, or discussion as appropriate for fall prevention, nutrition, physical activity, Tobacco-use cessation, social engagement, weight loss and cognition.  Checklist reviewing preventive services available has been given to the patient.  Reviewed patient's diet, addressing concerns and/or questions.   She is at risk for psychosocial distress and has been provided with information to reduce risk.       Patient Instructions   Labs today.  Refill medications will be sent when results return.    Begin Famotidine at  night to prevent heartburn symptoms.   Limit caffeine and eating later in the day.          Subjective   Celina is a 54 year old, presenting for the following:  Physical        10/3/2024     3:13 PM   Additional Questions   Roomed by STACY   Accompanied by SELF         10/3/2024     3:13 PM   Patient Reported Additional Medications   Patient reports taking the following new medications NO        Health Care Directive  Patient does not have a Health Care Directive or Living Will: Discussed advance care planning with patient; however, patient declined at this time.    HPI   Monthly bleeding - 4 days duration 2 heavy.      Anxiety   How are you doing with your anxiety since your last visit? No change  Are you having other symptoms that might be associated with anxiety? No  Have you had a significant life event? No   Are you feeling depressed? No  Do you have any concerns with your use of alcohol or other drugs? No  Episodic use of Xanax with panic attack symptoms/extreme anxiety.  Last prescription for 10 pills in May 2024.  Total of 20 pills using last year.  Continues with venlafaxine on a daily basis and trazodone at bedtime.  Social History     Tobacco Use    Smoking status: Former     Current packs/day: 0.00     Types: Cigarettes     Quit date: 2000     Years since quittin.7     Passive exposure: Never    Smokeless tobacco: Never   Vaping Use    Vaping status: Never Used   Substance Use Topics    Alcohol use: No    Drug use: No         2023     3:27 PM 2024     8:07 AM 2024     9:18 PM   YUDI-7 SCORE   Total Score 6 (mild anxiety) 7 (mild anxiety) 8 (mild anxiety)   Total Score 6 7 8         2021     8:28 AM 2022     1:38 PM 2024     9:17 PM   PHQ   PHQ-9 Total Score 3 0 2   Q9: Thoughts of better off dead/self-harm past 2 weeks Not at all Not at all Not at all       Acne vulgaris  Use of Retin-A as needed.  Has adequate supply available now.    GERD  Has used pantoprazole  episodically in the past with good effect.  GERD symptoms primarily at night.  No swallowing difficulty.  Water intake with sense of difficulty getting it down at times.  No food issues.        9/28/2024   General Health   How would you rate your overall physical health? Excellent   Feel stress (tense, anxious, or unable to sleep) Only a little      (!) STRESS CONCERN      9/28/2024   Nutrition   Three or more servings of calcium each day? Yes   Diet: Regular (no restrictions)   How many servings of fruit and vegetables per day? (!) 2-3   How many sweetened beverages each day? 0-1            9/28/2024   Exercise   Days per week of moderate/strenous exercise 4 days   Average minutes spent exercising at this level 40 min      Treadmill and weights - tolerated well.      9/28/2024   Social Factors   Frequency of gathering with friends or relatives Once a week   Worry food won't last until get money to buy more Patient declined   Food not last or not have enough money for food? Patient declined   Do you have housing? (Housing is defined as stable permanent housing and does not include staying ouside in a car, in a tent, in an abandoned building, in an overnight shelter, or couch-surfing.) Patient declined   Are you worried about losing your housing? Patient declined   Lack of transportation? Patient declined   Unable to get utilities (heat,electricity)? Patient declined            9/28/2024   Fall Risk   Fallen 2 or more times in the past year? No   Trouble with walking or balance? No             9/28/2024   Dental   Dentist two times every year? Yes            9/28/2024   TB Screening   Were you born outside of the [place]? No              Today's PHQ-2 Score:       5/7/2024     8:08 AM   PHQ-2 ( 1999 Pf[employer])   Q1: Little interest or pleasure in doing things 0   Q2: Feeling down, depressed or hopeless 1   PHQ-2 Score 1   Q1: Little interest or pleasure in doing things Not at all   Q2: Feeling down, depressed or hopeless  Several days   PHQ-2 Score 1         2024   Substance Use   Alcohol more than 3/day or more than 7/wk Not Applicable   Do you use any other substances recreationally? No        Social History     Tobacco Use    Smoking status: Former     Current packs/day: 0.00     Types: Cigarettes     Quit date: 2000     Years since quittin.7     Passive exposure: Never    Smokeless tobacco: Never   Vaping Use    Vaping status: Never Used   Substance Use Topics    Alcohol use: No    Drug use: No           2024   LAST FHS-7 RESULTS   1st degree relative breast or ovarian cancer No   Any relative bilateral breast cancer No   Any male have breast cancer Yes   Any ONE woman have BOTH breast AND ovarian cancer No   Any woman with breast cancer before 50yrs No   2 or more relatives with breast AND/OR ovarian cancer Yes   2 or more relatives with breast AND/OR bowel cancer No           Mammogram Screening - Mammogram every 1-2 years updated in Health Maintenance based on mutual decision making        2024   STI Screening   New sexual partner(s) since last STI/HIV test? No        History of abnormal Pap smear: no        Latest Ref Rng & Units 2023    10:42 AM 2017    12:00 AM   PAP / HPV   PAP  Negative for Intraepithelial Lesion or Malignancy (NILM)     HPV 16 DNA Negative Negative     HPV 18 DNA Negative Negative     Other HR HPV Negative Negative     PAP-ABSTRACT   See Scanned Document           This result is from an external source.     ASCVD Risk   The 10-year ASCVD risk score (Mirna LINDSEY, et al., 2019) is: 1.6%    Values used to calculate the score:      Age: 54 years      Sex: Female      Is Non- : No      Diabetic: No      Tobacco smoker: No      Systolic Blood Pressure: 129 mmHg      Is BP treated: No      HDL Cholesterol: 45 mg/dL      Total Cholesterol: 150 mg/dL           Reviewed and updated as needed this visit by Provider   Tobacco  Allergies  Meds   Med Hx  " Surg Hx  Fam Hx            History reviewed. No pertinent past medical history.  Past Surgical History:   Procedure Laterality Date    COLONOSCOPY WITH CO2 INSUFFLATION N/A 4/5/2023    Procedure: COLONOSCOPY, WITH CO2 INSUFFLATION;  Surgeon: Byron Goodson MD;  Location: [facility] OR     OB History   No obstetric history on file.     BP Readings from Last 3 Encounters:   10/03/24 129/85   06/26/24 118/82   05/08/23 132/83    Wt Readings from Last 3 Encounters:   10/03/24 79.8 kg (176 lb)   06/26/24 78 kg (171 lb 14.4 oz)   05/08/23 78.8 kg (173 lb 11.2 oz)                      Review of Systems  Constitutional, HEENT, cardiovascular, pulmonary, GI, , musculoskeletal, neuro, skin, endocrine and psych systems are negative, except as otherwise noted.       Objective    Exam  /85 (BP Location: Right arm, Patient Position: Sitting, Cuff Size: Adult Regular)   Pulse 77   Temp 98.4  F (36.9  C) (Temporal)   Resp 14   Ht 1.727 m (5' 8\")   Wt 79.8 kg (176 lb)   SpO2 95%   BMI 26.76 kg/m     Estimated body mass index is 26.76 kg/m  as calculated from the following:    Height as of this encounter: 1.727 m (5' 8\").    Weight as of this encounter: 79.8 kg (176 lb).    Physical Exam  GENERAL: alert, no distress, and over weight  EYES: Eyes grossly normal to inspection, PERRL and conjunctivae and sclerae normal  HENT: ear canals and TM's normal, nose and mouth without ulcers or lesions  NECK: no adenopathy, no asymmetry, masses, or scars  RESP: lungs clear to auscultation - no rales, rhonchi or wheezes  BREAST: normal without masses, tenderness or nipple discharge and no palpable axillary masses or adenopathy  CV: regular rate and rhythm, normal S1 S2, no S3 or S4, no murmur, click or rub, no peripheral edema  ABDOMEN: soft, nontender, no hepatosplenomegaly, no masses and bowel sounds normal   (female): normal female external genitalia, normal urethral meatus, normal vaginal mucosa  MS: no gross " musculoskeletal defects noted, no edema  SKIN: no suspicious lesions or rashes  NEURO: Normal strength and tone, mentation intact and speech normal  PSYCH: mentation appears normal, affect normal/bright        Signed Electronically by: Lucero Gonzalez MD

## 2024-10-04 LAB
CHOLEST SERPL-MCNC: 216 MG/DL
FASTING STATUS PATIENT QL REPORTED: NO
FASTING STATUS PATIENT QL REPORTED: NO
GLUCOSE SERPL-MCNC: 99 MG/DL (ref 70–99)
HDLC SERPL-MCNC: 49 MG/DL
LDLC SERPL CALC-MCNC: 143 MG/DL
MAGNESIUM SERPL-MCNC: 2 MG/DL (ref 1.7–2.3)
NONHDLC SERPL-MCNC: 167 MG/DL
TRIGL SERPL-MCNC: 119 MG/DL
VIT B12 SERPL-MCNC: 1379 PG/ML (ref 232–1245)
VIT D+METAB SERPL-MCNC: 64 NG/ML (ref 20–50)

## 2024-10-06 NOTE — RESULT ENCOUNTER NOTE
The 10-year ASCVD risk score (Mirna LINDSEY, et al., 2019) is: 2.2%    Values used to calculate the score:      Age: 54 years      Sex: Female      Is Non- : No      Diabetic: No      Tobacco smoker: No      Systolic Blood Pressure: 129 mmHg      Is BP treated: No      HDL Cholesterol: 49 mg/dL      Total Cholesterol: 216 mg/dL   Your vitamin B12 level is high.  This is not dangerous but you can decrease the supplement you are taking slightly if desired.  Vitamin D level is higher than normal.  You can decrease the supplement you are taking by 1000 international unit(s) daily and will still have adequate level.  Your cholesterol levels are higher than previous.  When considering these results and other risk factor assessment, your overall risk for heart disease is in a range that is best managed with healthy diet and exercise.  Blood sugar level and magnesium level are both normal.  Please call or MyChart message me if you have any questions.      PENELOPE

## 2024-10-29 ENCOUNTER — MYC REFILL (OUTPATIENT)
Dept: FAMILY MEDICINE | Facility: CLINIC | Age: 54
End: 2024-10-29
Payer: COMMERCIAL

## 2024-10-29 DIAGNOSIS — F41.1 GAD (GENERALIZED ANXIETY DISORDER): ICD-10-CM

## 2024-10-29 RX ORDER — ALPRAZOLAM 0.5 MG
0.5 TABLET ORAL 3 TIMES DAILY PRN
Qty: 10 TABLET | Refills: 0 | Status: SHIPPED | OUTPATIENT
Start: 2024-10-29

## 2024-10-29 NOTE — TELEPHONE ENCOUNTER
Refill request noted.  PMDP reviewed.  Next visit due Oct 2025    Pain contract  n/a        10 tabs in May.

## 2024-11-25 ENCOUNTER — MYC REFILL (OUTPATIENT)
Dept: FAMILY MEDICINE | Facility: CLINIC | Age: 54
End: 2024-11-25
Payer: COMMERCIAL

## 2024-11-25 DIAGNOSIS — L70.0 ACNE VULGARIS: ICD-10-CM

## 2024-11-25 DIAGNOSIS — F51.01 PRIMARY INSOMNIA: ICD-10-CM

## 2024-11-25 DIAGNOSIS — F41.1 GAD (GENERALIZED ANXIETY DISORDER): ICD-10-CM

## 2024-11-25 RX ORDER — TRETINOIN 0.5 MG/G
CREAM TOPICAL
Qty: 45 G | Refills: 1 | Status: SHIPPED | OUTPATIENT
Start: 2024-11-25

## 2024-11-25 RX ORDER — TRAZODONE HYDROCHLORIDE 50 MG/1
50 TABLET, FILM COATED ORAL AT BEDTIME
Qty: 90 TABLET | Refills: 1 | Status: SHIPPED | OUTPATIENT
Start: 2024-11-25

## 2024-11-25 RX ORDER — VENLAFAXINE HYDROCHLORIDE 150 MG/1
150 CAPSULE, EXTENDED RELEASE ORAL DAILY
Qty: 90 CAPSULE | Refills: 1 | Status: SHIPPED | OUTPATIENT
Start: 2024-11-25

## 2025-01-18 ENCOUNTER — MYC REFILL (OUTPATIENT)
Dept: FAMILY MEDICINE | Facility: CLINIC | Age: 55
End: 2025-01-18
Payer: COMMERCIAL

## 2025-01-18 DIAGNOSIS — H10.13 ALLERGIC CONJUNCTIVITIS OF BOTH EYES: ICD-10-CM

## 2025-01-20 ENCOUNTER — MYC REFILL (OUTPATIENT)
Dept: FAMILY MEDICINE | Facility: CLINIC | Age: 55
End: 2025-01-20
Payer: COMMERCIAL

## 2025-01-20 DIAGNOSIS — K21.9 GASTROESOPHAGEAL REFLUX DISEASE WITHOUT ESOPHAGITIS: ICD-10-CM

## 2025-01-20 RX ORDER — FAMOTIDINE 40 MG/1
40 TABLET, FILM COATED ORAL DAILY
Qty: 90 TABLET | Refills: 0 | Status: SHIPPED | OUTPATIENT
Start: 2025-01-20

## 2025-01-20 NOTE — TELEPHONE ENCOUNTER
Responded to [employer] message requesting additional details to refill as requested by refill team. Vincenzo Palacios RN, BSN

## 2025-01-21 RX ORDER — CROMOLYN SODIUM 40 MG/ML
1 SOLUTION/ DROPS OPHTHALMIC 4 TIMES DAILY
Qty: 10 ML | Refills: 0 | Status: SHIPPED | OUTPATIENT
Start: 2025-01-21

## 2025-01-21 NOTE — TELEPHONE ENCOUNTER
Routing to provider for further review. Patient responded to [employer] message stating that she uses prescription for allergies. Please advise. Vincenzo Palacios RN, BSN

## 2025-01-21 NOTE — TELEPHONE ENCOUNTER
Second attempt. Patient has not viewed [employer] message. Left message for patient to return call to clinic per refill team as requested:        RN, if/when patient returns call, please review message as shown. Vincenzo Palacios RN, BSN

## 2025-05-02 ENCOUNTER — MYC REFILL (OUTPATIENT)
Dept: FAMILY MEDICINE | Facility: CLINIC | Age: 55
End: 2025-05-02
Payer: COMMERCIAL

## 2025-05-02 DIAGNOSIS — F41.1 GAD (GENERALIZED ANXIETY DISORDER): ICD-10-CM

## 2025-05-03 RX ORDER — ALPRAZOLAM 0.5 MG
0.5 TABLET ORAL 3 TIMES DAILY PRN
Qty: 10 TABLET | Refills: 0 | Status: SHIPPED | OUTPATIENT
Start: 2025-05-03

## 2025-05-06 ASSESSMENT — ANXIETY QUESTIONNAIRES
GAD7 TOTAL SCORE: 15
IF YOU CHECKED OFF ANY PROBLEMS ON THIS QUESTIONNAIRE, HOW DIFFICULT HAVE THESE PROBLEMS MADE IT FOR YOU TO DO YOUR WORK, TAKE CARE OF THINGS AT HOME, OR GET ALONG WITH OTHER PEOPLE: SOMEWHAT DIFFICULT
4. TROUBLE RELAXING: NEARLY EVERY DAY
3. WORRYING TOO MUCH ABOUT DIFFERENT THINGS: NEARLY EVERY DAY
6. BECOMING EASILY ANNOYED OR IRRITABLE: SEVERAL DAYS
8. IF YOU CHECKED OFF ANY PROBLEMS, HOW DIFFICULT HAVE THESE MADE IT FOR YOU TO DO YOUR WORK, TAKE CARE OF THINGS AT HOME, OR GET ALONG WITH OTHER PEOPLE?: SOMEWHAT DIFFICULT
2. NOT BEING ABLE TO STOP OR CONTROL WORRYING: MORE THAN HALF THE DAYS
7. FEELING AFRAID AS IF SOMETHING AWFUL MIGHT HAPPEN: NEARLY EVERY DAY
5. BEING SO RESTLESS THAT IT IS HARD TO SIT STILL: NOT AT ALL
1. FEELING NERVOUS, ANXIOUS, OR ON EDGE: NEARLY EVERY DAY
7. FEELING AFRAID AS IF SOMETHING AWFUL MIGHT HAPPEN: NEARLY EVERY DAY
GAD7 TOTAL SCORE: 15
GAD7 TOTAL SCORE: 15

## 2025-05-07 ENCOUNTER — VIRTUAL VISIT (OUTPATIENT)
Dept: FAMILY MEDICINE | Facility: CLINIC | Age: 55
End: 2025-05-07
Payer: COMMERCIAL

## 2025-05-07 DIAGNOSIS — K21.9 GASTROESOPHAGEAL REFLUX DISEASE WITHOUT ESOPHAGITIS: ICD-10-CM

## 2025-05-07 DIAGNOSIS — Z87.19 HISTORY OF PANCREATITIS: ICD-10-CM

## 2025-05-07 DIAGNOSIS — F41.1 GAD (GENERALIZED ANXIETY DISORDER): Primary | ICD-10-CM

## 2025-05-07 DIAGNOSIS — R91.8 PULMONARY NODULES: ICD-10-CM

## 2025-05-07 DIAGNOSIS — N92.1 MENORRHAGIA WITH IRREGULAR CYCLE: ICD-10-CM

## 2025-05-07 DIAGNOSIS — F51.01 PRIMARY INSOMNIA: ICD-10-CM

## 2025-05-07 PROCEDURE — 1125F AMNT PAIN NOTED PAIN PRSNT: CPT | Mod: 95 | Performed by: FAMILY MEDICINE

## 2025-05-07 PROCEDURE — 98007 SYNCH AUDIO-VIDEO EST HI 40: CPT | Performed by: FAMILY MEDICINE

## 2025-05-07 RX ORDER — VENLAFAXINE HYDROCHLORIDE 75 MG/1
75 CAPSULE, EXTENDED RELEASE ORAL DAILY
Qty: 90 CAPSULE | Refills: 1 | Status: SHIPPED | OUTPATIENT
Start: 2025-05-07

## 2025-05-07 RX ORDER — TRAZODONE HYDROCHLORIDE 50 MG/1
50 TABLET ORAL AT BEDTIME
Qty: 90 TABLET | Refills: 1 | Status: SHIPPED | OUTPATIENT
Start: 2025-05-07

## 2025-05-07 RX ORDER — VENLAFAXINE HYDROCHLORIDE 150 MG/1
150 CAPSULE, EXTENDED RELEASE ORAL DAILY
Qty: 90 CAPSULE | Refills: 1 | Status: SHIPPED | OUTPATIENT
Start: 2025-05-07

## 2025-05-07 NOTE — PATIENT INSTRUCTIONS
Please request hospital in [place] to send the records from scans and labs work and discharge summary.  [phone]   fax    Let me know when you have requested this so I can watch for records.       Trial of TUMS if recurrent discomfort across the upper abdomen.      Increase the Venlafaxine to 225 mg for anxiety symptoms.    Continue the trazodone.

## 2025-05-07 NOTE — PROGRESS NOTES
Celina is a 54 year old who is being evaluated via a billable video visit.    How would you like to obtain your AVS? MyChart  If the video visit is dropped, the invitation should be resent by: Text to cell phone: [phone]  Will anyone else be joining your video visit? No      Assessment & Plan     YUDI (generalized anxiety disorder)  Increased anxiety reported currently.  There is likely some situational component to her current symptoms.  She has recently started counseling.  We discussed adjusting her medication due to pervasive symptoms and she is agreeable to doing that.  Trial of venlafaxine to 25 mg daily is planned.  She has taken BuSpar in the past but is uncertain of the benefit.  This would be an alternative adjustment that could be made if persistent symptoms are noted in spite of the venlafaxine dose adjustment.  Ongoing use of trazodone for sleep.  Refill is updated.  - venlafaxine (EFFEXOR XR) 75 MG 24 hr capsule; Take 1 capsule (75 mg) by mouth daily. For use with 150 mg pill for 225 mg daily.  - venlafaxine (EFFEXOR XR) 150 MG 24 hr capsule; Take 1 capsule (150 mg) by mouth daily. For use with 75 mg pill for 225 mg daily.  - traZODone (DESYREL) 50 MG tablet; Take 1 tablet (50 mg) by mouth at bedtime.    Primary insomnia  Use of trazodone at bedtime for sleep assistance.  Continue current treatment.  - traZODone (DESYREL) 50 MG tablet; Take 1 tablet (50 mg) by mouth at bedtime.    Gastroesophageal reflux disease without esophagitis  Discomfort across the upper abdomen.  Continued use of famotidine is recommended.  Consider increasing back to twice daily dosing.  If she has a sudden onset of the upper abdominal discomfort, I have recommended she trial antacid such as Tums to see if there is immediate benefit.  If benefit is noted then increasing the famotidine to twice daily would be recommended.  If no change in symptoms are noted but improvement is noted following a bowel movement then this  "discomfort could be related to constipation.  She continues on the MiraLAX on a regular basis with relatively good control of her bowel function.  Follow-up if symptoms fail to be controlled.  I will review lab testing when it is received related to the potential pancreatitis.  Additional lab evaluation may be needed once those labs are reviewed.    Pulmonary nodules  History of pulmonary nodule noted on scan in September 2021.  Recommendations on that report indicated a follow-up scan in 2 years would be recommended.  We are past that date now and she is agreeable to doing that additional screening/testing.  Orders placed and she will call to set up that appointment.  Plans to go in in the next few weeks for her mammogram when due and will plan to do it at the same time.  - CT Chest w/o Contrast; Future    Menorrhagia with irregular cycle  Upcoming appointment with gynecology.  She will monitor her menstrual cycle in the interim.    History of pancreatitis  Patient will contact the [facility] in [place] for them to forward her discharge summary lab results and imaging results for my review.  Will determine at that point what additional testing may be needed (there was some indication verbally to her that endoscopic ultrasound may be needed)        BMI  Estimated body mass index is 26.76 kg/m  as calculated from the following:    Height as of 10/3/24: 1.727 m (5' 8\").    Weight as of 10/3/24: 79.8 kg (176 lb).   Weight management plan: Discussed healthy diet and exercise guidelines    Patient Instructions   Please request [facility] in [place] to send the records from scans and labs work and discharge summary.  [phone]   fax    Let me know when you have requested this so I can watch for records.       Trial of TUMS if recurrent discomfort across the upper abdomen.      Increase the Venlafaxine to 225 mg for anxiety symptoms.    Continue the trazodone.      The longitudinal plan of care for the " diagnosis(es)/condition(s) as documented were addressed during this visit. Due to the added complexity in care, I will continue to support Celina in the subsequent management and with ongoing continuity of care.    [facility]  Review of external notes as documented elsewhere in note  Review of the result(s) of each unique test - CXR  Ordering of each unique test  Prescription drug management  45 minutes spent by me on the date of the encounter doing chart review, review of outside records, review of test results, interpretation of tests, patient visit, and documentation     Follow-up   Return in about 5 months (around 10/7/2025) for Physical Exam.        Subjective   Celina is a 54 year old, presenting for the following health issues:  Follow Up (Medication )      5/7/2025     7:53 AM   Additional Questions   Roomed by BB Vf   Accompanied by None         5/7/2025     7:53 AM   Patient Reported Additional Medications   Patient reports taking the following new medications None     History of Present Illness       Mental Health Follow-up:  Patient presents to follow-up on Anxiety.    Patient's anxiety since last visit has been:  Bad  The patient is having other symptoms associated with anxiety.  Any significant life events: grief or loss and health concerns  Patient is feeling anxious or having panic attacks.  Patient has no concerns about alcohol or drug use.    Reason for visit:  Follow up from hospitalization in [place], questions about symptoms  Symptom onset:  More than a month  Symptoms include:  Inflammation  Symptom intensity:  Mild  Symptom progression:  Staying the same  Had these symptoms before:  Yes  Has tried/received treatment for these symptoms:  No   She is taking medications regularly.        Depression and Anxiety   How are you doing with your depression since your last visit? No change  How are you doing with  "your anxiety since your last visit?  Worsened situational concerns, health concerns, hormone changes.   Are you having other symptoms that might be associated with depression or anxiety? No  Have you had a significant life event? Health Concerns, mother with recent dementia diagnosis  Do you have any concerns with your use of alcohol or other drugs? No  Mom with dementia diagnosis.  In [place] - bladder infection in December.   Antibiotics - macrobid initially side effects noted and changed to alternative.  Repeat infection in .  Finger issue - missing layers of skin.  Another round of antibiotics for this.  Reports stomach upset.  History pancreatitis -she was concerned with the abdominal symptoms so went to ED in [facility]-she was admitted admitted, pain medication 3 days.  Had scans/MRI and lab work -there was some concern on scans related to the pancreas and there was a new notation for her to follow-up once she returned home for potentially an endoscopic ultrasound.  She does not have a copy of those records but will get those forwarded to me for my review.  Symptoms have improved although still will have some discomfort across the upper abdomen at times.  Symptoms of discomfort, irritated by coffee.    Taking probiotics.  Colostrum drink with benefit for GERD symptoms she thinks.  Taking pepcid once daily.  Has taken twice daily in the past.    Right sided upper abdomen \"inflamed\" sensation.  Was told by a naturopath that she had liver inflammation.    Started counseling .    Social History     Tobacco Use    Smoking status: Former     Current packs/day: 0.00     Types: Cigarettes     Quit date: 2000     Years since quittin.3     Passive exposure: Never    Smokeless tobacco: Never   Vaping Use    Vaping status: Never Used   Substance Use Topics    Alcohol use: No    Drug use: No         2021     8:28 AM 2022     1:38 PM 2024     9:17 PM   PHQ   PHQ-9 Total Score 3 0 2   Q9: Thoughts " of better off dead/self-harm past 2 weeks Not at all Not at all Not at all         5/7/2024     8:07 AM 6/5/2024     9:18 PM 5/6/2025     8:02 AM   YUDI-7 SCORE   Total Score 7 (mild anxiety) 8 (mild anxiety) 15 (severe anxiety)   Total Score 7 8 15        Patient-reported         Suicide Assessment Five-step Evaluation and Treatment (SAFE-T)    Constipation - miralax with benefit.  Bloating common.      Irregular menses: Likely perimenopausal based on age and symptoms.  Has appointment with gyn - 2 periods in last month (end of March, mid April)- heavy flow with second one.  Kait 10.  Warm but not hot flashes.                Review of Systems  Constitutional, HEENT, cardiovascular, pulmonary, gi and gu systems are negative, except as otherwise noted.      Objective    Vitals - Patient Reported  Pain Score: Mild Pain (2)  Pain Loc: Mid Back        Physical Exam   GENERAL: alert and no distress  EYES: Eyes grossly normal to inspection.  No discharge or erythema, or obvious scleral/conjunctival abnormalities.  RESP: No audible wheeze, cough, or visible cyanosis.    SKIN: Visible skin clear. No significant rash, abnormal pigmentation or lesions.  NEURO: Cranial nerves grossly intact.  Mentation and speech appropriate for age.  PSYCH: Appropriate affect, tone, and pace of words          Video-Visit Details    Type of service:  Video Visit   Originating Location (pt. Location): Home    Distant Location (provider location):  Off-site  Platform used for Video Visit: Brenda  Signed Electronically by: Lucero Gonzalez MD        This chart was documented by provider using a voice activated software called Dragon in addition to manual typing. There may be vocabulary errors or other grammatical errors due to this.

## 2025-06-09 ENCOUNTER — PATIENT OUTREACH (OUTPATIENT)
Dept: CARE COORDINATION | Facility: CLINIC | Age: 55
End: 2025-06-09
Payer: COMMERCIAL

## 2025-06-29 ENCOUNTER — MYC REFILL (OUTPATIENT)
Dept: FAMILY MEDICINE | Facility: CLINIC | Age: 55
End: 2025-06-29
Payer: COMMERCIAL

## 2025-06-29 DIAGNOSIS — K21.9 GASTROESOPHAGEAL REFLUX DISEASE WITHOUT ESOPHAGITIS: ICD-10-CM

## 2025-06-30 RX ORDER — FAMOTIDINE 40 MG/1
40 TABLET, FILM COATED ORAL DAILY
Qty: 90 TABLET | Refills: 0 | Status: SHIPPED | OUTPATIENT
Start: 2025-06-30

## 2025-07-07 ENCOUNTER — PATIENT OUTREACH (OUTPATIENT)
Dept: CARE COORDINATION | Facility: CLINIC | Age: 55
End: 2025-07-07
Payer: COMMERCIAL

## 2025-07-28 ENCOUNTER — MYC MEDICAL ADVICE (OUTPATIENT)
Dept: FAMILY MEDICINE | Facility: CLINIC | Age: 55
End: 2025-07-28
Payer: COMMERCIAL

## 2025-07-28 DIAGNOSIS — F43.0 ANXIETY AS ACUTE REACTION TO GROSS STRESS: Primary | ICD-10-CM

## 2025-07-28 DIAGNOSIS — F41.1 ANXIETY AS ACUTE REACTION TO GROSS STRESS: Primary | ICD-10-CM

## 2025-07-29 RX ORDER — LORAZEPAM 1 MG/1
1 TABLET ORAL DAILY PRN
Qty: 4 TABLET | Refills: 0 | Status: SHIPPED | OUTPATIENT
Start: 2025-07-29

## 2025-07-29 NOTE — TELEPHONE ENCOUNTER
Routing to provider to review and advise on switching her Xanax to Lorazepam for in flight use to [place].    Champ Ritchie RN  [facility]

## 2025-09-01 ENCOUNTER — PATIENT OUTREACH (OUTPATIENT)
Dept: CARE COORDINATION | Facility: CLINIC | Age: 55
End: 2025-09-01
Payer: COMMERCIAL

## (undated) DEVICE — ENDO FORCEP ENDOJAW BIOPSY 2.0MMX155CM FB-221K

## (undated) DEVICE — ENDO CATH ESOPH BALLOON 10-11-12MMX180CM CRE FIXED WIRE

## (undated) DEVICE — SWAB PROCTO 16" NON-STERILE

## (undated) DEVICE — ENDO CATH ESOPH BALLOON 18-19-20MMX180CM CRE FIXED WIRE

## (undated) DEVICE — SUCTION TIP YANKAUER W/O VENT K86

## (undated) DEVICE — SPECIMEN CONTAINER 60MLW/10% FORMALIN 59601

## (undated) DEVICE — CUP DENTURE 7.5OZ GOLD DISP

## (undated) DEVICE — ENDO CATH ESOPH/PYL/COLONIC BALLOON 18-19-20MMX240CM CRE

## (undated) DEVICE — ESU ERBE FIAPC PROBE 2.3MMX220CM

## (undated) DEVICE — ENDO CATH ESOPH BALLOON 15-16.5-18MMX180CM CRE FIXED WIRE

## (undated) DEVICE — ENDO CATH ESOPH/PYL/COLONIC BALLOON 10-11-12MMX240CM CRE

## (undated) DEVICE — ENDO CATH ESOPH BALLOON 06-7-8MMX180CM CRE FIXED WIRE

## (undated) DEVICE — SYR 50ML CATH TIP W/O NDL 309620

## (undated) DEVICE — ENDO CATH ESOPH BALLOON 12-13.5-15MMX180CM CRE FIXED WIRE

## (undated) DEVICE — DEVICE RETRIEVAL ROTH NET PLATINUM UNIV 2.5MMX230CM 00715050

## (undated) DEVICE — SUCTION CANISTER MEDIVAC LINER 1500ML W/LID 65651-515

## (undated) DEVICE — ENDO CATH ESOPH/PYL/COLONIC BALLOON 08-9-10MMX240CM CRE

## (undated) DEVICE — ENDO SNARE OVAL 2.5X4.0CM W/25GA NDL

## (undated) DEVICE — ENDO CATH ESOPH/PYL/COLONIC BALLOON 12-13.5-15MMX240CM CRE

## (undated) DEVICE — ENDO FORCEP ENDOJAW BIOPSY 2.8MMX230CM FB-220U

## (undated) DEVICE — Device

## (undated) DEVICE — SYR INFLATOR AND GAUGE 60ML M00550601

## (undated) DEVICE — SYR 50ML SLIP TIP W/O NDL 309654

## (undated) DEVICE — ENDO MARKER SPOT 5ML

## (undated) DEVICE — SPECIMEN TRAP 80ML BUSSE BW407

## (undated) DEVICE — SPECIMEN TRAP VACUUM SUCTION 003984-901

## (undated) DEVICE — ENDO CATH ESOPH BALLOON 08-9-10MMX180CM CRE FIXED WIRE

## (undated) DEVICE — SOL WATER IRRIG 1000ML BOTTLE 07139-09

## (undated) DEVICE — ENDO CATH ESOPH/PYL/COLONIC BALLOON 15-16.5-18MMX240CM CRE

## (undated) RX ORDER — SIMETHICONE 40MG/0.6ML
SUSPENSION, DROPS(FINAL DOSAGE FORM)(ML) ORAL
Status: DISPENSED
Start: 2023-04-05

## (undated) RX ORDER — FENTANYL CITRATE 50 UG/ML
INJECTION, SOLUTION INTRAMUSCULAR; INTRAVENOUS
Status: DISPENSED
Start: 2023-04-05